# Patient Record
Sex: FEMALE | Race: WHITE | Employment: OTHER | URBAN - METROPOLITAN AREA
[De-identification: names, ages, dates, MRNs, and addresses within clinical notes are randomized per-mention and may not be internally consistent; named-entity substitution may affect disease eponyms.]

---

## 2017-01-10 ENCOUNTER — HOSPITAL ENCOUNTER (OUTPATIENT)
Dept: OTHER | Age: 67
Discharge: HOME | End: 2017-01-10
Attending: INTERNAL MEDICINE | Admitting: INTERNAL MEDICINE

## 2017-01-10 LAB
ANION GAP SERPL CALCULATED.3IONS-SCNC: 13 MMOL/L (ref 3–16)
BUN BLDV-MCNC: 28 MG/DL (ref 7–20)
CALCIUM SERPL-MCNC: 9.6 MG/DL (ref 8.3–10.6)
CHLORIDE BLD-SCNC: 101 MMOL/L (ref 99–110)
CO2: 26 MMOL/L (ref 21–32)
CREAT SERPL-MCNC: 1.1 MG/DL (ref 0.6–1.2)
GFR AFRICAN AMERICAN: >60
GFR NON-AFRICAN AMERICAN: 50
GLUCOSE BLD-MCNC: 69 MG/DL (ref 70–99)
POTASSIUM SERPL-SCNC: 5 MMOL/L (ref 3.5–5.1)
SODIUM BLD-SCNC: 140 MMOL/L (ref 136–145)

## 2017-01-11 LAB
ESTIMATED AVERAGE GLUCOSE: 85.3 MG/DL
HBA1C MFR BLD: 4.6 %

## 2017-01-23 ENCOUNTER — HOSPITAL ENCOUNTER (OUTPATIENT)
Dept: OTHER | Age: 67
Discharge: HOME | End: 2017-01-23
Attending: PHYSICIAN ASSISTANT | Admitting: PHYSICIAN ASSISTANT

## 2017-01-23 LAB — VITAMIN D 25-HYDROXY: 32.8 NG/ML

## 2018-04-11 ENCOUNTER — HOSPITAL ENCOUNTER (OUTPATIENT)
Dept: OTHER | Age: 68
Discharge: HOME | End: 2018-04-30
Attending: PHYSICIAN ASSISTANT | Admitting: PHYSICIAN ASSISTANT

## 2018-04-11 LAB
ALBUMIN SERPL-MCNC: 4.9 G/DL (ref 3.4–5)
ALP BLD-CCNC: 62 U/L (ref 40–129)
ALT SERPL-CCNC: 41 U/L (ref 10–40)
ANION GAP SERPL CALCULATED.3IONS-SCNC: 11 MMOL/L (ref 3–16)
AST SERPL-CCNC: 39 U/L (ref 15–37)
BASOPHILS ABSOLUTE: 0.1 K/UL (ref 0–0.2)
BASOPHILS RELATIVE PERCENT: 1.3 %
BILIRUB SERPL-MCNC: 1.3 MG/DL (ref 0–1)
BILIRUBIN DIRECT: 0.3 MG/DL (ref 0–0.3)
BILIRUBIN, INDIRECT: 1 MG/DL (ref 0–1)
BUN BLDV-MCNC: 24 MG/DL (ref 7–20)
CALCIUM SERPL-MCNC: 9.6 MG/DL (ref 8.3–10.6)
CHLORIDE BLD-SCNC: 100 MMOL/L (ref 99–110)
CO2: 28 MMOL/L (ref 21–32)
CREAT SERPL-MCNC: 1 MG/DL (ref 0.6–1.2)
EOSINOPHILS ABSOLUTE: 0.1 K/UL (ref 0–0.6)
EOSINOPHILS RELATIVE PERCENT: 2.3 %
GFR AFRICAN AMERICAN: >60
GFR NON-AFRICAN AMERICAN: 56
GLUCOSE BLD-MCNC: 102 MG/DL (ref 70–99)
HCT VFR BLD CALC: 39 % (ref 36–48)
HEMOGLOBIN: 13.7 G/DL (ref 12–16)
LYMPHOCYTES ABSOLUTE: 1.7 K/UL (ref 1–5.1)
LYMPHOCYTES RELATIVE PERCENT: 31.6 %
MCH RBC QN AUTO: 34.7 PG (ref 26–34)
MCHC RBC AUTO-ENTMCNC: 35.1 G/DL (ref 31–36)
MCV RBC AUTO: 98.9 FL (ref 80–100)
MONOCYTES ABSOLUTE: 0.4 K/UL (ref 0–1.3)
MONOCYTES RELATIVE PERCENT: 6.6 %
NEUTROPHILS ABSOLUTE: 3.2 K/UL (ref 1.7–7.7)
NEUTROPHILS RELATIVE PERCENT: 58.2 %
PDW BLD-RTO: 14 % (ref 12.4–15.4)
PHOSPHORUS: 3.7 MG/DL (ref 2.5–4.9)
PLATELET # BLD: 143 K/UL (ref 135–450)
PMV BLD AUTO: 7.5 FL (ref 5–10.5)
POTASSIUM SERPL-SCNC: 5.7 MMOL/L (ref 3.5–5.1)
RBC # BLD: 3.94 M/UL (ref 4–5.2)
SODIUM BLD-SCNC: 139 MMOL/L (ref 136–145)
TOTAL PROTEIN: 7 G/DL (ref 6.4–8.2)
WBC # BLD: 5.5 K/UL (ref 4–11)

## 2018-04-12 LAB
HBV SURFACE AB TITR SER: 79.29 MIU/ML
HEPATITIS B CORE TOTAL ANTIBODY: POSITIVE
HEPATITIS B SURFACE ANTIGEN INTERPRETATION: NORMAL
TACROLIMUS BLOOD: 8.8 NG/ML (ref 5–20)

## 2018-05-01 ENCOUNTER — HOSPITAL ENCOUNTER (OUTPATIENT)
Dept: OTHER | Age: 68
Discharge: HOME | End: 2018-05-31
Attending: PHYSICIAN ASSISTANT | Admitting: PHYSICIAN ASSISTANT

## 2018-10-01 ENCOUNTER — HOSPITAL ENCOUNTER (OUTPATIENT)
Age: 68
Discharge: HOME | End: 2018-10-01
Payer: MEDICARE

## 2018-10-01 LAB
ALBUMIN SERPL-MCNC: 4.8 G/DL (ref 3.4–5)
ALP BLD-CCNC: 58 U/L (ref 40–129)
ALT SERPL-CCNC: 20 U/L (ref 10–40)
ANION GAP SERPL CALCULATED.3IONS-SCNC: 15 MMOL/L (ref 3–16)
AST SERPL-CCNC: 27 U/L (ref 15–37)
BASOPHILS ABSOLUTE: 0 K/UL (ref 0–0.2)
BASOPHILS RELATIVE PERCENT: 1 %
BILIRUB SERPL-MCNC: 1.4 MG/DL (ref 0–1)
BILIRUBIN DIRECT: 0.3 MG/DL (ref 0–0.3)
BILIRUBIN, INDIRECT: 1.1 MG/DL (ref 0–1)
BUN BLDV-MCNC: 22 MG/DL (ref 7–20)
CALCIUM SERPL-MCNC: 9.8 MG/DL (ref 8.3–10.6)
CHLORIDE BLD-SCNC: 100 MMOL/L (ref 99–110)
CO2: 25 MMOL/L (ref 21–32)
CREAT SERPL-MCNC: 1 MG/DL (ref 0.6–1.2)
EOSINOPHILS ABSOLUTE: 0.1 K/UL (ref 0–0.6)
EOSINOPHILS RELATIVE PERCENT: 1.7 %
GFR AFRICAN AMERICAN: >60
GFR NON-AFRICAN AMERICAN: 55
GLUCOSE BLD-MCNC: 94 MG/DL (ref 70–99)
HBV SURFACE AB TITR SER: 67.85 MIU/ML
HCT VFR BLD CALC: 39.1 % (ref 36–48)
HEMOGLOBIN: 13.4 G/DL (ref 12–16)
HEPATITIS B SURFACE ANTIGEN INTERPRETATION: NORMAL
LYMPHOCYTES ABSOLUTE: 1.2 K/UL (ref 1–5.1)
LYMPHOCYTES RELATIVE PERCENT: 25.2 %
MCH RBC QN AUTO: 33.9 PG (ref 26–34)
MCHC RBC AUTO-ENTMCNC: 34.1 G/DL (ref 31–36)
MCV RBC AUTO: 99.3 FL (ref 80–100)
MONOCYTES ABSOLUTE: 0.2 K/UL (ref 0–1.3)
MONOCYTES RELATIVE PERCENT: 5.4 %
NEUTROPHILS ABSOLUTE: 3.1 K/UL (ref 1.7–7.7)
NEUTROPHILS RELATIVE PERCENT: 66.7 %
PDW BLD-RTO: 14 % (ref 12.4–15.4)
PHOSPHORUS: 3 MG/DL (ref 2.5–4.9)
PLATELET # BLD: 128 K/UL (ref 135–450)
PMV BLD AUTO: 7.8 FL (ref 5–10.5)
POTASSIUM SERPL-SCNC: 5.2 MMOL/L (ref 3.5–5.1)
PRO-BNP: 435 PG/ML (ref 0–124)
RBC # BLD: 3.94 M/UL (ref 4–5.2)
SODIUM BLD-SCNC: 140 MMOL/L (ref 136–145)
TOTAL PROTEIN: 7.1 G/DL (ref 6.4–8.2)
WBC # BLD: 4.6 K/UL (ref 4–11)

## 2018-10-01 PROCEDURE — 84100 ASSAY OF PHOSPHORUS: CPT

## 2018-10-01 PROCEDURE — 80076 HEPATIC FUNCTION PANEL: CPT

## 2018-10-01 PROCEDURE — 83880 ASSAY OF NATRIURETIC PEPTIDE: CPT

## 2018-10-01 PROCEDURE — 86706 HEP B SURFACE ANTIBODY: CPT

## 2018-10-01 PROCEDURE — 80048 BASIC METABOLIC PNL TOTAL CA: CPT

## 2018-10-01 PROCEDURE — 87340 HEPATITIS B SURFACE AG IA: CPT

## 2018-10-01 PROCEDURE — 36415 COLL VENOUS BLD VENIPUNCTURE: CPT

## 2018-10-01 PROCEDURE — 85025 COMPLETE CBC W/AUTO DIFF WBC: CPT

## 2018-10-01 PROCEDURE — 86704 HEP B CORE ANTIBODY TOTAL: CPT

## 2018-10-01 PROCEDURE — 80197 ASSAY OF TACROLIMUS: CPT

## 2018-10-02 LAB — TACROLIMUS BLOOD: 7.2 NG/ML (ref 5–20)

## 2018-10-03 LAB — HEPATITIS B CORE TOTAL ANTIBODY: POSITIVE

## 2018-12-04 ENCOUNTER — HOSPITAL ENCOUNTER (OUTPATIENT)
Age: 68
Discharge: HOME | End: 2018-12-04
Payer: MEDICARE

## 2018-12-04 ENCOUNTER — HOSPITAL ENCOUNTER (OUTPATIENT)
Dept: GENERAL RADIOLOGY | Age: 68
Discharge: HOME | End: 2018-12-04
Payer: MEDICARE

## 2018-12-04 DIAGNOSIS — J20.9 ACUTE BRONCHITIS, UNSPECIFIED ORGANISM: ICD-10-CM

## 2018-12-04 PROCEDURE — 71046 X-RAY EXAM CHEST 2 VIEWS: CPT

## 2019-05-16 ENCOUNTER — HOSPITAL ENCOUNTER (INPATIENT)
Age: 69
LOS: 8 days | Discharge: HOME | DRG: 559 | End: 2019-05-24
Attending: PHYSICAL MEDICINE & REHABILITATION | Admitting: PHYSICAL MEDICINE & REHABILITATION
Payer: MEDICARE

## 2019-05-16 DIAGNOSIS — T07.XXXA MULTIPLE TRAUMA: Primary | ICD-10-CM

## 2019-05-16 PROCEDURE — 51798 US URINE CAPACITY MEASURE: CPT

## 2019-05-16 PROCEDURE — 6370000000 HC RX 637 (ALT 250 FOR IP): Performed by: PHYSICAL MEDICINE & REHABILITATION

## 2019-05-16 PROCEDURE — 6360000002 HC RX W HCPCS: Performed by: PHYSICAL MEDICINE & REHABILITATION

## 2019-05-16 PROCEDURE — 1280000000 HC REHAB R&B

## 2019-05-16 RX ORDER — PRAVASTATIN SODIUM 10 MG
10 TABLET ORAL NIGHTLY
Status: DISCONTINUED | OUTPATIENT
Start: 2019-05-16 | End: 2019-05-24 | Stop reason: HOSPADM

## 2019-05-16 RX ORDER — GABAPENTIN 300 MG/1
300 CAPSULE ORAL 3 TIMES DAILY
Status: DISCONTINUED | OUTPATIENT
Start: 2019-05-16 | End: 2019-05-24 | Stop reason: HOSPADM

## 2019-05-16 RX ORDER — TRAZODONE HYDROCHLORIDE 50 MG/1
50 TABLET ORAL NIGHTLY PRN
Status: DISCONTINUED | OUTPATIENT
Start: 2019-05-16 | End: 2019-05-24 | Stop reason: HOSPADM

## 2019-05-16 RX ORDER — CITALOPRAM 20 MG/1
40 TABLET ORAL DAILY
Status: DISCONTINUED | OUTPATIENT
Start: 2019-05-17 | End: 2019-05-24 | Stop reason: HOSPADM

## 2019-05-16 RX ORDER — ONDANSETRON 4 MG/1
4 TABLET, ORALLY DISINTEGRATING ORAL EVERY 8 HOURS PRN
Status: DISCONTINUED | OUTPATIENT
Start: 2019-05-16 | End: 2019-05-24 | Stop reason: HOSPADM

## 2019-05-16 RX ORDER — POLYETHYLENE GLYCOL 3350 17 G/17G
17 POWDER, FOR SOLUTION ORAL 2 TIMES DAILY
Status: DISCONTINUED | OUTPATIENT
Start: 2019-05-16 | End: 2019-05-18

## 2019-05-16 RX ORDER — ACETAMINOPHEN 325 MG/1
650 TABLET ORAL EVERY 4 HOURS PRN
Status: DISCONTINUED | OUTPATIENT
Start: 2019-05-16 | End: 2019-05-24 | Stop reason: HOSPADM

## 2019-05-16 RX ORDER — ASPIRIN 81 MG/1
81 TABLET, CHEWABLE ORAL DAILY
Status: DISCONTINUED | OUTPATIENT
Start: 2019-05-17 | End: 2019-05-24 | Stop reason: HOSPADM

## 2019-05-16 RX ORDER — MYCOPHENOLATE MOFETIL 500 MG/1
500 TABLET ORAL 2 TIMES DAILY
Status: DISCONTINUED | OUTPATIENT
Start: 2019-05-16 | End: 2019-05-24 | Stop reason: HOSPADM

## 2019-05-16 RX ORDER — OMEGA-3S/DHA/EPA/FISH OIL/D3 300MG-1000
1000 CAPSULE ORAL DAILY
Status: DISCONTINUED | OUTPATIENT
Start: 2019-05-17 | End: 2019-05-24 | Stop reason: HOSPADM

## 2019-05-16 RX ORDER — TACROLIMUS 1 MG/1
2 CAPSULE ORAL 2 TIMES DAILY
Status: DISCONTINUED | OUTPATIENT
Start: 2019-05-16 | End: 2019-05-24 | Stop reason: HOSPADM

## 2019-05-16 RX ORDER — METHOCARBAMOL 500 MG/1
1000 TABLET, FILM COATED ORAL 3 TIMES DAILY
Status: DISCONTINUED | OUTPATIENT
Start: 2019-05-16 | End: 2019-05-24 | Stop reason: HOSPADM

## 2019-05-16 RX ORDER — HYDRALAZINE HYDROCHLORIDE 50 MG/1
50 TABLET, FILM COATED ORAL EVERY 8 HOURS PRN
Status: DISCONTINUED | OUTPATIENT
Start: 2019-05-16 | End: 2019-05-24 | Stop reason: HOSPADM

## 2019-05-16 RX ORDER — PROPRANOLOL HYDROCHLORIDE 20 MG/1
10 TABLET ORAL 2 TIMES DAILY
Status: DISCONTINUED | OUTPATIENT
Start: 2019-05-16 | End: 2019-05-24 | Stop reason: HOSPADM

## 2019-05-16 RX ORDER — IPRATROPIUM BROMIDE AND ALBUTEROL SULFATE 2.5; .5 MG/3ML; MG/3ML
1 SOLUTION RESPIRATORY (INHALATION) EVERY 6 HOURS PRN
Status: DISCONTINUED | OUTPATIENT
Start: 2019-05-16 | End: 2019-05-24 | Stop reason: HOSPADM

## 2019-05-16 RX ORDER — LIDOCAINE 4 G/G
1 PATCH TOPICAL DAILY
Status: DISCONTINUED | OUTPATIENT
Start: 2019-05-16 | End: 2019-05-24 | Stop reason: HOSPADM

## 2019-05-16 RX ORDER — DIPHENHYDRAMINE HCL 25 MG
25 TABLET ORAL EVERY 6 HOURS PRN
Status: DISCONTINUED | OUTPATIENT
Start: 2019-05-16 | End: 2019-05-24 | Stop reason: HOSPADM

## 2019-05-16 RX ORDER — OXYCODONE HYDROCHLORIDE 5 MG/1
5 TABLET ORAL EVERY 6 HOURS PRN
Status: DISCONTINUED | OUTPATIENT
Start: 2019-05-16 | End: 2019-05-24 | Stop reason: HOSPADM

## 2019-05-16 RX ADMIN — OXYCODONE HYDROCHLORIDE 5 MG: 5 TABLET ORAL at 23:39

## 2019-05-16 RX ADMIN — PROPRANOLOL HYDROCHLORIDE 10 MG: 20 TABLET ORAL at 23:40

## 2019-05-16 RX ADMIN — MYCOPHENOLATE MOFETIL 500 MG: 500 TABLET, FILM COATED ORAL at 23:42

## 2019-05-16 RX ADMIN — PRAVASTATIN SODIUM 10 MG: 10 TABLET ORAL at 23:40

## 2019-05-16 RX ADMIN — TACROLIMUS 2 MG: 1 CAPSULE ORAL at 23:40

## 2019-05-16 RX ADMIN — METHOCARBAMOL TABLETS 1000 MG: 500 TABLET, COATED ORAL at 23:39

## 2019-05-16 RX ADMIN — GABAPENTIN 300 MG: 300 CAPSULE ORAL at 23:39

## 2019-05-16 ASSESSMENT — PAIN DESCRIPTION - LOCATION
LOCATION: RIB CAGE
LOCATION: RIB CAGE

## 2019-05-16 ASSESSMENT — PAIN SCALES - GENERAL
PAINLEVEL_OUTOF10: 7
PAINLEVEL_OUTOF10: 6
PAINLEVEL_OUTOF10: 7
PAINLEVEL_OUTOF10: 2

## 2019-05-16 ASSESSMENT — PAIN DESCRIPTION - ONSET
ONSET: ON-GOING
ONSET: ON-GOING

## 2019-05-16 ASSESSMENT — PAIN DESCRIPTION - FREQUENCY
FREQUENCY: INTERMITTENT
FREQUENCY: INTERMITTENT

## 2019-05-16 ASSESSMENT — PAIN - FUNCTIONAL ASSESSMENT
PAIN_FUNCTIONAL_ASSESSMENT: PREVENTS OR INTERFERES SOME ACTIVE ACTIVITIES AND ADLS
PAIN_FUNCTIONAL_ASSESSMENT: PREVENTS OR INTERFERES SOME ACTIVE ACTIVITIES AND ADLS

## 2019-05-16 ASSESSMENT — PAIN DESCRIPTION - PROGRESSION
CLINICAL_PROGRESSION: NOT CHANGED
CLINICAL_PROGRESSION: GRADUALLY WORSENING
CLINICAL_PROGRESSION: GRADUALLY WORSENING

## 2019-05-16 ASSESSMENT — PAIN DESCRIPTION - DESCRIPTORS
DESCRIPTORS: STABBING
DESCRIPTORS: STABBING

## 2019-05-16 ASSESSMENT — PAIN DESCRIPTION - ORIENTATION
ORIENTATION: LEFT
ORIENTATION: LEFT

## 2019-05-16 ASSESSMENT — PAIN DESCRIPTION - PAIN TYPE
TYPE: ACUTE PAIN
TYPE: ACUTE PAIN

## 2019-05-17 LAB
A/G RATIO: 1.5 (ref 1.1–2.2)
ALBUMIN SERPL-MCNC: 3.6 G/DL (ref 3.4–5)
ALP BLD-CCNC: 125 U/L (ref 40–129)
ALT SERPL-CCNC: 8 U/L (ref 10–40)
ANION GAP SERPL CALCULATED.3IONS-SCNC: 10 MMOL/L (ref 3–16)
AST SERPL-CCNC: 20 U/L (ref 15–37)
BILIRUB SERPL-MCNC: 0.6 MG/DL (ref 0–1)
BUN BLDV-MCNC: 18 MG/DL (ref 7–20)
CALCIUM SERPL-MCNC: 9.1 MG/DL (ref 8.3–10.6)
CHLORIDE BLD-SCNC: 101 MMOL/L (ref 99–110)
CO2: 29 MMOL/L (ref 21–32)
CREAT SERPL-MCNC: 0.9 MG/DL (ref 0.6–1.2)
GFR AFRICAN AMERICAN: >60
GFR NON-AFRICAN AMERICAN: >60
GLOBULIN: 2.4 G/DL
GLUCOSE BLD-MCNC: 99 MG/DL (ref 70–99)
HCT VFR BLD CALC: 26.3 % (ref 36–48)
HEMOGLOBIN: 8.9 G/DL (ref 12–16)
MCH RBC QN AUTO: 33.6 PG (ref 26–34)
MCHC RBC AUTO-ENTMCNC: 34 G/DL (ref 31–36)
MCV RBC AUTO: 98.8 FL (ref 80–100)
PDW BLD-RTO: 16.1 % (ref 12.4–15.4)
PLATELET # BLD: 227 K/UL (ref 135–450)
PMV BLD AUTO: 7.1 FL (ref 5–10.5)
POTASSIUM SERPL-SCNC: 4.8 MMOL/L (ref 3.5–5.1)
RBC # BLD: 2.66 M/UL (ref 4–5.2)
SODIUM BLD-SCNC: 140 MMOL/L (ref 136–145)
TOTAL PROTEIN: 6 G/DL (ref 6.4–8.2)
WBC # BLD: 2.3 K/UL (ref 4–11)

## 2019-05-17 PROCEDURE — 97161 PT EVAL LOW COMPLEX 20 MIN: CPT | Performed by: PHYSICAL THERAPIST

## 2019-05-17 PROCEDURE — 36415 COLL VENOUS BLD VENIPUNCTURE: CPT

## 2019-05-17 PROCEDURE — 80053 COMPREHEN METABOLIC PANEL: CPT

## 2019-05-17 PROCEDURE — 85027 COMPLETE CBC AUTOMATED: CPT

## 2019-05-17 PROCEDURE — 1280000000 HC REHAB R&B

## 2019-05-17 PROCEDURE — 97535 SELF CARE MNGMENT TRAINING: CPT

## 2019-05-17 PROCEDURE — 97116 GAIT TRAINING THERAPY: CPT | Performed by: PHYSICAL THERAPIST

## 2019-05-17 PROCEDURE — 97530 THERAPEUTIC ACTIVITIES: CPT | Performed by: PHYSICAL THERAPIST

## 2019-05-17 PROCEDURE — 97542 WHEELCHAIR MNGMENT TRAINING: CPT | Performed by: PHYSICAL THERAPIST

## 2019-05-17 PROCEDURE — 6360000002 HC RX W HCPCS: Performed by: PHYSICAL MEDICINE & REHABILITATION

## 2019-05-17 PROCEDURE — 97165 OT EVAL LOW COMPLEX 30 MIN: CPT

## 2019-05-17 PROCEDURE — 97530 THERAPEUTIC ACTIVITIES: CPT

## 2019-05-17 PROCEDURE — 6370000000 HC RX 637 (ALT 250 FOR IP): Performed by: PHYSICAL MEDICINE & REHABILITATION

## 2019-05-17 RX ORDER — CALCIUM CARBONATE 200(500)MG
500 TABLET,CHEWABLE ORAL 3 TIMES DAILY PRN
Status: DISCONTINUED | OUTPATIENT
Start: 2019-05-17 | End: 2019-05-24 | Stop reason: HOSPADM

## 2019-05-17 RX ADMIN — CHOLECALCIFEROL (VITAMIN D3) 10 MCG (400 UNIT) TABLET 1000 UNITS: at 09:17

## 2019-05-17 RX ADMIN — METHOCARBAMOL TABLETS 1000 MG: 500 TABLET, COATED ORAL at 20:33

## 2019-05-17 RX ADMIN — ASPIRIN 81 MG 81 MG: 81 TABLET ORAL at 09:17

## 2019-05-17 RX ADMIN — MYCOPHENOLATE MOFETIL 500 MG: 500 TABLET, FILM COATED ORAL at 09:17

## 2019-05-17 RX ADMIN — METHOCARBAMOL TABLETS 1000 MG: 500 TABLET, COATED ORAL at 13:44

## 2019-05-17 RX ADMIN — TACROLIMUS 2 MG: 1 CAPSULE ORAL at 09:17

## 2019-05-17 RX ADMIN — PROPRANOLOL HYDROCHLORIDE 10 MG: 20 TABLET ORAL at 09:45

## 2019-05-17 RX ADMIN — METHOCARBAMOL TABLETS 1000 MG: 500 TABLET, COATED ORAL at 09:16

## 2019-05-17 RX ADMIN — ENOXAPARIN SODIUM 40 MG: 40 INJECTION SUBCUTANEOUS at 09:18

## 2019-05-17 RX ADMIN — PROPRANOLOL HYDROCHLORIDE 10 MG: 20 TABLET ORAL at 20:34

## 2019-05-17 RX ADMIN — MYCOPHENOLATE MOFETIL 500 MG: 500 TABLET, FILM COATED ORAL at 20:34

## 2019-05-17 RX ADMIN — GABAPENTIN 300 MG: 300 CAPSULE ORAL at 09:16

## 2019-05-17 RX ADMIN — GABAPENTIN 300 MG: 300 CAPSULE ORAL at 13:44

## 2019-05-17 RX ADMIN — TACROLIMUS 2 MG: 1 CAPSULE ORAL at 20:34

## 2019-05-17 RX ADMIN — GABAPENTIN 300 MG: 300 CAPSULE ORAL at 20:34

## 2019-05-17 RX ADMIN — OXYCODONE HYDROCHLORIDE 5 MG: 5 TABLET ORAL at 20:34

## 2019-05-17 RX ADMIN — OXYCODONE HYDROCHLORIDE 5 MG: 5 TABLET ORAL at 05:58

## 2019-05-17 RX ADMIN — OXYCODONE HYDROCHLORIDE 5 MG: 5 TABLET ORAL at 13:44

## 2019-05-17 RX ADMIN — CITALOPRAM HYDROBROMIDE 40 MG: 20 TABLET ORAL at 09:16

## 2019-05-17 RX ADMIN — PRAVASTATIN SODIUM 10 MG: 10 TABLET ORAL at 20:33

## 2019-05-17 RX ADMIN — CALCIUM CARBONATE 500 MG: 500 TABLET, CHEWABLE ORAL at 20:33

## 2019-05-17 ASSESSMENT — PAIN DESCRIPTION - PROGRESSION
CLINICAL_PROGRESSION: NOT CHANGED

## 2019-05-17 ASSESSMENT — PAIN DESCRIPTION - ONSET
ONSET: ON-GOING

## 2019-05-17 ASSESSMENT — PAIN DESCRIPTION - PAIN TYPE
TYPE: ACUTE PAIN

## 2019-05-17 ASSESSMENT — PAIN DESCRIPTION - FREQUENCY
FREQUENCY: INTERMITTENT

## 2019-05-17 ASSESSMENT — PAIN DESCRIPTION - DESCRIPTORS
DESCRIPTORS: STABBING
DESCRIPTORS: STABBING
DESCRIPTORS: SHARP
DESCRIPTORS: STABBING
DESCRIPTORS: STABBING

## 2019-05-17 ASSESSMENT — PAIN SCALES - GENERAL
PAINLEVEL_OUTOF10: 5
PAINLEVEL_OUTOF10: 0
PAINLEVEL_OUTOF10: 6
PAINLEVEL_OUTOF10: 3
PAINLEVEL_OUTOF10: 7
PAINLEVEL_OUTOF10: 6
PAINLEVEL_OUTOF10: 7
PAINLEVEL_OUTOF10: 6

## 2019-05-17 ASSESSMENT — PAIN DESCRIPTION - ORIENTATION
ORIENTATION: LEFT

## 2019-05-17 ASSESSMENT — PAIN DESCRIPTION - LOCATION
LOCATION: RIB CAGE
LOCATION: RIB CAGE
LOCATION: RIB CAGE;BACK
LOCATION: BACK;RIB CAGE
LOCATION: RIB CAGE

## 2019-05-17 NOTE — H&P
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 Imaging revealed left anterior 3-9 and left posterior 11th rib fractures. She required placement of chest tube which was performed  and subsequently removed on . She was also noted to have an anterior T3 wedge fracture and was suggested to utilize a TLSO when out of bed. She was evaluated by therapy suggested to continue in an inpatient setting prior to returning home. She reports her pain currently is controlled. 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 has a past surgical history that includes Cholecystectomy ();  section (); Upper gastrointestinal endoscopy (11); Colonoscopy; Colonoscopy; Upper gastrointestinal endoscopy (); Upper gastrointestinal endoscopy (2011); Upper gastrointestinal endoscopy (11); Upper gastrointestinal endoscopy (11); Endoscopy, colon, diagnostic; Upper gastrointestinal endoscopy (10-6-14); Colonoscopy (11/10/14); Upper gastrointestinal endoscopy (11/18/15); and fracture surgery. reports that she quit smoking about 4 years ago.  Her smoking use included cigarettes. She has a 25.00 pack-year smoking history. She quit smokeless tobacco use about 4 years ago. She reports that she does not drink alcohol or use drugs. family history includes Allergy (Severe) in her brother; Cancer in her brother and sister; Coronary Art Dis in her brother; Diabetes in her mother; Heart Disease in her brother and father; High Blood Pressure in her brother, father, and sister; Prostate Cancer in her brother; Stroke in her mother. Allergies: Latex; Erythromycin; Penicillins; Macrolides and ketolides; Tape [adhesive tape];  Tetracycline; and Tetracyclines & related    Current Facility-Administered Medications   Medication Dose Route Frequency Provider Last Rate Last Dose    acetaminophen (TYLENOL) tablet 650 mg  650 mg Oral Q4H PRN Tip Montalvo MD        enoxaparin (LOVENOX) injection 40 mg  40 mg Subcutaneous Daily Tip Montalvo MD   40 mg at 05/17/19 0918    magnesium hydroxide (MILK OF MAGNESIA) 400 MG/5ML suspension 30 mL  30 mL Oral Daily PRN Tip Montalvo MD        aspirin chewable tablet 81 mg  81 mg Oral Daily Tip Montalvo MD   81 mg at 05/17/19 0917    citalopram (CELEXA) tablet 40 mg  40 mg Oral Daily Tip Montalvo MD   40 mg at 05/17/19 0916    diphenhydrAMINE (BENADRYL) tablet 25 mg  25 mg Oral Q6H PRN Tip Montalvo MD        gabapentin (NEURONTIN) capsule 300 mg  300 mg Oral TID Tip Montalvo MD   300 mg at 05/17/19 1344    hydrALAZINE (APRESOLINE) tablet 50 mg  50 mg Oral Q8H PRN Tip Montalvo MD        ipratropium-albuterol (DUONEB) nebulizer solution 1 ampule  1 ampule Inhalation Q6H PRN Tip Montalvo MD        lidocaine 4 % external patch 1 patch  1 patch Transdermal Daily Tip Montalvo MD   1 patch at 05/17/19 0804    methocarbamol (ROBAXIN) tablet 1,000 mg  1,000 mg Oral TID Tip Montalvo MD   1,000 mg at 05/17/19 1344    mycophenolate (CELLCEPT) tablet 500 mg  500 mg Oral BID Tip Montalvo MD   500 mg at 05/17/19 0917    ondansetron (ZOFRAN-ODT) disintegrating tablet 4 mg  4 mg Oral Q8H PRN Pete Castorena MD        oxyCODONE (ROXICODONE) immediate release tablet 5 mg  5 mg Oral Q6H PRN Pete Castorena MD   5 mg at 05/17/19 1344    polyethylene glycol (GLYCOLAX) packet 17 g  17 g Oral BID Pete Castorena MD        pravastatin (PRAVACHOL) tablet 10 mg  10 mg Oral Nightly Pete Castorena MD   10 mg at 05/16/19 2340    propranolol (INDERAL) tablet 10 mg  10 mg Oral BID Pete Castorena MD   10 mg at 05/17/19 0945    tacrolimus (PROGRAF) capsule 2 mg  2 mg Oral BID Pete Castorena MD   2 mg at 05/17/19 2973    traZODone (DESYREL) tablet 50 mg  50 mg Oral Nightly PRN Pete Castorena MD        vitamin D3 (CHOLECALCIFEROL) tablet 1,000 Units  1,000 Units Oral Daily Pete Castorena MD   1,000 Units at 05/17/19 8969       REVIEW OF SYSTEMS:   CONSTITUTIONAL: negative for fevers, chills, diaphoresis, appetite change, fatigue, night sweats and unexpected weight change. EYES: negative for blurred vision, eye discharge, visual disturbance and icterus. HEENT: negative for hearing loss, tinnitus, ear drainage, sinus pressure, nasal congestion, and epistaxis. RESPIRATORY: Negative for hemoptysis, cough, sputum production. CARDIOVASCULAR: negative for chest pain, palpitations, exertional chest pressure/discomfort, edema, syncope. GASTROINTESTINAL: negative for nausea, vomiting, diarrhea, constipation, blood in stool and abdominal pain. GENITOURINARY: negative for frequency, dysuria, urinary incontinence, decreased urine volume, and hematuria. HEMATOLOGIC/LYMPHATIC: negative for easy bruising, bleeding and lymphadenopathy. ALLERGIC/IMMUNOLOGIC: negative for recurrent infections, angioedema, anaphylaxis and drug reactions. ENDOCRINE: negative for weight changes and diabetic symptoms including polyuria, polydipsia and polyphagia.    MUSCULOSKELETAL: negative for joint swelling, decreased range of motion and muscle weakness. NEUROLOGICAL: negative for headaches, slurred speech, unilateral weakness. PSYCHIATRIC/BEHAVIORAL: negative for hallucinations, behavioral problems, confusion and agitation. All pertinent positives are noted in the HPI. Physical Examination:  Vitals:   Patient Vitals for the past 24 hrs:   BP Temp Temp src Pulse Resp SpO2 Height Weight   05/17/19 0900 (!) 111/53 98.3 °F (36.8 °C) Oral 84 20 92 % -- --   05/17/19 0551 126/66 98.4 °F (36.9 °C) Oral 76 20 93 % -- --   05/16/19 2044 -- -- -- -- -- 90 % -- --   05/16/19 2015 106/83 97.8 °F (36.6 °C) Axillary 82 20 (!) 86 % 5' 6\" (1.676 m) 129 lb 10.1 oz (58.8 kg)     Psych: Stable mood, normal judgement, normal affect   Const: No distress  Eyes: Conjunctiva noninjected, no icterus noted; pupils equal, round. HENT: Atraumatic, normocephalic; Oral mucosa moist  Neck: Trachea midline, neck supple. No thyromegaly noted. CV: Regular rate and rhythm, no murmur rub or gallop noted  Resp: Lungs clear to auscultation bilaterally, no rales wheezes or ronchi, no retractions. Respirations unlabored. GI: Soft, nontender, nondistended. Normal bowel sounds. No palpable masses. Neuro: Alert, oriented, appropriate. No cranial nerve deficits appreciated. Sensation intact to light touch. Motor examination reveals normal strength in all four limbs diffusely. Reflexes normal and symmetric. Skin: Normal temperature and turgor  MSK: No joint abnormalities noted. Ext: No significant edema appreciated. No varicosities.     Lab Results   Component Value Date    WBC 2.3 (L) 05/17/2019    HGB 8.9 (L) 05/17/2019    HCT 26.3 (L) 05/17/2019    MCV 98.8 05/17/2019     05/17/2019     Lab Results   Component Value Date    INR 1.22 (H) 04/20/2016    INR 1.11 10/28/2015    INR 1.23 (H) 05/13/2015    PROTIME 14.0 (H) 04/20/2016    PROTIME 12.0 10/28/2015    PROTIME 13.4 (H) 05/13/2015     Lab Results   Component Value Date    CREATININE 0.9 05/17/2019    BUN 18 05/17/2019     05/17/2019    K 4.8 05/17/2019     05/17/2019    CO2 29 05/17/2019     Lab Results   Component Value Date    ALT 8 (L) 05/17/2019    AST 20 05/17/2019    ALKPHOS 125 05/17/2019    BILITOT 0.6 05/17/2019       Most recent imaging studies revealed   05/06/2019 11:10 AM EDT    Exam: CT CHEST WO CONTRAST dated 5/6/2019 9:57 AM EDT    CLINICAL HISTORY: Chest trauma, fall. History of liver transplant. COMPARISON: Prior chest CT dated 4/30/2019 and 8/6/2016. Prior CT thoracic spine dated 4/30/2019. TECHNIQUE: Multidetector CT imaging was obtained through the chest in the supine position without intravenous contrast. The images were reconstructed with 5 and 1 mm collimation. Additional axial MIP images were reconstructed. FOV: 36 cm    FINDINGS:     HEART: The heart is normal in size. No significant pericardial effusion. Calcified plaque within the coronary arteries. THORACIC AORTA: Normal caliber and contour. There is a left-sided aortic arch with the left vertebral artery arising directly off the aorta, a normal variant. There are scattered calcified atherosclerotic plaques noted throughout the aorta and its branches. PULMONARY ARTERIES: Normal in caliber. MEDIASTINUM AND MANSOOR: There is a borderline enlarged right precarinal lymph node measuring up to 1.0 cm in short axis with a preserved fatty hilum, likely reactive. Otherwise no suspicious mediastinal or hilar lymphadenopathy. PLEURA: New small bilateral pleural effusions, left greater the right. No evidence of pneumothorax. AIRWAYS & LUNGS: There are secretions noted within the trachea. Additionally, there are scattered areas of probable mucous plugging. Otherwise, the central airways are patent. There is new left lower lobe consolidation adjacent to the pleural effusion and mild peripheral adjacent groundglass opacity.  There is also mild consolidation adjacent to the right pleural effusion with patchy peribronchovascular consolidative opacities in the right lower lobe. Mild new peripheral lingular consolidation. There are a few new ground glass opacities in the anterior right and left upper lobes. There are no new suspicious pulmonary nodules. UPPER ABDOMEN: Postsurgical changes of prior liver transplant are noted. There is trace perisplenic ascites. MUSCULOSKELETAL: There are fractures of the left anterior third, fourth, and fifth ribs. There are segmental fractures of the left sixth, seventh, eighth, and suspected ninth ribs although the anterior ninth rib is partially excluded from the field-of-view. The segmental fractures are somewhat increased in conspicuity from the prior study. Nondisplaced fractures of the left posterior 10th and 11th ribs are noted. Multiple healed right-sided rib fracture deformities are redemonstrated. Mild anterior wedging of T3 is unchanged from 4/30/2019 but is new from 8/6/2016. There is a mild compression deformity of T6 is unchanged in 2016. IMPRESSION:    New bilateral pleural effusions, multifocal areas of consolidation, and groundglass opacities most compatible with multifocal pneumonia. Redemonstration of multiple left-sided rib fractures as detailed above with segmental fractures of the left 6th-9th ribs. Recommend clinical correlation for flail chest.    Redemonstration of mild anterior wedging of T3, new from 2016 and consistent with an age-indeterminate fracture. The above laboratory data have been reviewed. The above imaging data have been reviewed. The above medical testing have been reviewed. Body mass index is 20.92 kg/m². Barriers to Discharge: pain, safety, ADLs, respiratory status    POST ADMISSION PHYSICIAN EVALUATION  The patient has agreed to being admitted to our comprehensive inpatient  rehabilitation facility consisting of at least 180 minutes of therapy a day,  5 out of 7 days a week.     The patient/family has a good understanding of our discharge process. The  patient has potential to make improvement and is in need of at least two of  the following multidisciplinary therapies including but not limited to  physical, occupational, respiratory, and speech, nutritional services, wound care, and prosthetics and orthotics. Given the patients complex condition  and risk of further medical complications, rehabilitation services cannot be  safely provided at a lower level of care such as a skilled nursing facility. I have compared the patients medical and functional status at the time of the  preadmission screening and the same on this date, and there are no significant changes except as documented below in the assessment and plan. By signing this document, I acknowledge that I have personally performed a  full physical examination on this patient within 24 hours of admission to  this inpatient rehabilitation facility and have determined the patient to be  able to tolerate the above course of treatment at an intensive level for a  reasonable period of time. I will be completing a detailed individualized  Plan of Care for this patient by day four of the patients stay based upon the  Preadmission Screen, this Post-Admission Evaluation, and the therapy  evaluations. Assessment and Plan:  Multiple trauma: pain control and pulmonary toilet for rib fractures. TLSO for T3 compression fx. PT/OT    OLT: cellcept and prograf levels appropriate at . CMP appropriate on admission. Neuropathy: gabapentin    HTN: inderal      Depression: celexa    CAD: ASA    Bowels: Per protocol  Bladder: Per protocol   Sleep: Trazodone provided prn. Pain: Robaxin scheduled, crow PRN  DVT PPx: Lovenox     Kike Ma MD 5/17/2019, 5:35 PM     * This document was created using dictation software. While all precautions were taken to ensure accuracy, errors may have occurred. Please disregard any typographical errors.

## 2019-05-17 NOTE — PROGRESS NOTES
Call out to Dr. Viera Sensing office about showering without brace.     His office called back, see N.O.

## 2019-05-17 NOTE — PLAN OF CARE
Problem: Pain:  Goal: Pain level will decrease  Description  Pain level will decrease  Outcome: Ongoing   Pt. Complaint of pain, pain medication administered, see MAR. Pt. Complaint of excruciating pain to left shoulder upon movement. Pain level will be decreased or be at a satisfactory level by discharge. Problem: Falls - Risk of:  Goal: Will remain free from falls  Description  Will remain free from falls  Outcome: Ongoing   Pt. Admitted to facility with impaired gait and weakness. Fall prevention measures in place to promote safety and reduce the risk of falls: Fall sign posted on door, bed wheels locked and in lowest position, call light and over bed table placed within reach., video surveillance in place. Hourly rounding and frequent visual checks in place.  Will continue to monitor

## 2019-05-17 NOTE — PROGRESS NOTES
Pt. Arrived on the unit via stretcher and transport team at approximately 2005 hr. Pt appears A & O X 4 afebrile, with MORALES. Oxygen via NC at 2L and de-sat to 86%. O2 increased to 3L  For therapeutic purpose then will be decreased back to 2 L . Pt. Oriented to room and facility policy. Verbalized understanding.

## 2019-05-17 NOTE — PROGRESS NOTES
Physical Therapy    Facility/Department: [place] [address] [facility]  Initial Assessment/ Daily Treatment note    NAME: Trell Hall  : 1950  MRN: [id number]    Date of Service: 2019    Discharge Recommendations:  Home independently, Home with Home health PT   PT Equipment Recommendations  Other: Ongoing assessment at this time    Assessment   Body structures, Functions, Activity limitations: Decreased functional mobility ; Decreased endurance;Decreased balance; Increased Pain  Assessment: Pt is a 70YO female s/p a fall down concrete steps resulting in multiple injuries. At this time , pt is limited by pain and SOB. PT was not on O2 PTA and now is req 4L and with activity 5L to maintain O2 sats > 90%. Pt has pain with deep breaths and has become very congested. PT noted min impulsivity with walking and transf . Pt is highly motivated to resume PLOF . Pt is well below baseline and would benefit from continued therapy to maximize potential and increase functional mobility towards Ind to allow for a safer return home. Treatment Diagnosis: Multiple trauma resulting in decreased functional mobility  Decision Making: Low Complexity  Patient Education: Role of PT, the alarm systems in the room, transf training, gt training, bed mobility skills and w/c training. PT also educated pt on positioning for postural drainage   Barriers to Learning: None noted  REQUIRES PT FOLLOW UP: Yes       Patient Diagnosis(es): There were no encounter diagnoses.      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, COPD (chronic obstructive pulmonary disease) ([facility] Diabetes mellitus ([facility] End stage liver disease ([facility] Esophageal varices with bleeding(456.0), GERD (gastroesophageal reflux disease), GERD (gastroesophageal reflux disease), History of blood transfusion, Hyperlipidemia, Hypertension, IBS (irritable bowel syndrome), Liver disease, NSTEMI (non-ST elevated myocardial infarction) ([facility] to porch -2 different ways to enter each has 1 railing )  Entrance Stairs - Rails: Both  Bathroom Shower/Tub: Shower chair with back, Tub/Shower unit  Bathroom Toilet: Standard  Bathroom Equipment: Grab bars in shower(Uses sink to get up off the commode)  Bathroom Accessibility: Accessible  Home Equipment: Grab bars  Receives Help From: Family  ADL Assistance: Independent  Homemaking Assistance: Independent  Homemaking Responsibilities: Yes(son does laundry)  Ambulation Assistance: Independent  Transfer Assistance: Independent  Active : Yes  Mode of Transportation: Car  Education: Masters in [place]  Occupation: Part time employment  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 [address] , meeting with friends, cook   Cognition        Objective          AROM RLE (degrees)  RLE AROM: WNL  AROM LLE (degrees)  LLE AROM : WNL  Strength RLE  Strength RLE: WFL  Comment: >or= 3/5 (MMT deferred 2/2 to recent surgery )   Strength LLE  Strength LLE: WFL  Comment: >or = 3/5 (MMT deferred 2/2 to recent surgery )         Bed mobility  Rolling to Left: Modified independent(Severe rib pain elicited when rolling to that side)  Rolling to Right: Contact guard assistance(Req  VC  for technique and to obtain the full range)  Supine to Sit: Contact guard assistance(VC for energy efficient technique and to minimize pain)  Sit to Supine: Supervision(VC for technique )  Scooting: Independent  Comment: PT attempted to position bed flat  but 2/2 to breathing issues pt was unable to aureliano a flat bed. The  bedrails were also lowered to simulate home situation.    Transfers  Sit to Stand: Contact guard assistance( from bed, BS chair, and armed chair to a RW)  Stand to sit: Stand by assistance  Bed to Chair: Contact guard assistance(Able to take a few steps )  Stand Pivot Transfers: Contact guard assistance  Ambulation  Ambulation?: Yes  WB Status: No WB restrictions noted in the chart  More Ambulation?: Yes  Ambulation 1  Surface: level tile  Device: Rolling Walker  Other Apparatus: O2(4-5L )  Assistance: Contact guard assistance  Quality of Gait: Narrow ACACIA, min unsteady, circumducts LLE with swing through  Distance: 105', 160' short distances in the gym,   Ambulation 2  Surface - 2: ramp  Device 2: [address] [facility] 2: Contact guard assistance  Quality of Gait 2: Refer to above   Distance: 100' x1  Comments: CGA 2/2 to decreased control with managing the RW when descending  Stairs/Curb  Stairs?: Yes  Stairs  # Steps : 3  Stairs Height: 6\"  Rails: Bilateral  Device: No Device  Assistance: Contact guard assistance  Wheelchair Activities  Wheelchair Size: 18''  Wheelchair Type: Standard  Wheelchair Cushion: Pressure Relieving  Wheelchair Parts Management: Yes  Left Brakes Level of Assistance: Supervision(VC to fully engage the brake)  Right Brakes Level of Assistance: Supervision(VC to fully engage the brake)  Propulsion: Yes  Propulsion 1  Propulsion: Manual  Level: Level Tile  Method: RLE;LLE;LUE;RUE  Level of Assistance: Supervision(Req A to transport O2)  Description/ Details: used all 4 extrem but PT encouraged pt to minimize LUE use to minimize pain  Distance: 150'     Balance  Sitting - Static: Good  Sitting - Dynamic: Good  Standing - Static: Fair  Standing - Dynamic: Fair  Comments: 2/2 to pain , pt tends to flex at trunk and appears minimally \"guarded\"        Second session: Pt sitting in BS chair when PT arrived. Pt agreeable to therapy.    Transfers  Sit to Stand: Contact guard assistance/SBA(( from  [facility] chair, commode and  armed chair to a RW)  Stand to sit: Stand by assistance( VC for hand placement)    Ambulation 1  Surface: level tile  Device: Rolling Walker  Other Apparatus: O2(4-5L )  Assistance: Contact guard assistance  Distance: 100' x2  W/c propulsion( instructed pt in the room educating pt on the possibility following all therapy evaluations and collaboration by the team for pt to be

## 2019-05-17 NOTE — PROGRESS NOTES
Pt up in chair at this time. Reports 3/10 pain to L side of rib cage satisfied with pillow repositioned. All meds taken without difficulty. Call light within reach and chair alarm engaged. For patient safety, Visual Surveillance is in place, reviewed with patient/family, verbalized understanding.

## 2019-05-17 NOTE — PROGRESS NOTES
RESPIRATORY THERAPY ASSESSMENT    Name:  [address] [place] [address] Record Number:  [id number]  Age: 79 y.o. Gender: female  : 1950  Today's Date:  2019  Room:  3105/3105-01    Assessment     Is the patient being admitted for a COPD or Asthma exacerbation? No   (If yes the patient will be seen every 4 hours for the first 24 hours and then reassessed)    Patient Admission Diagnosis      Allergies  Allergies   Allergen Reactions    Latex Rash    Erythromycin Hives     Chest pain, hives    All mycins cause this problem  Chest pain, hives    All mycins cause this problem    Penicillins Hives    Macrolides And Ketolides     Tape Marisa Crate Tape] Rash     Occurs with prolonged exposure    Tetracycline Hives, Nausea And Vomiting and Nausea Only     Severe stomach pain    Tetracyclines & Related Hives, Nausea And Vomiting and Other (See Comments)     Severe stomach pain       Minimum Predicted Vital Capacity:     748          Actual Vital Capacity:      1250              Pulmonary History:COPD  Home Oxygen Therapy:  room air  Home Respiratory Therapy:Albuterol/Ipratropium Bromide HHN   Current Respiratory Therapy:  hhn duoneb prn          Respiratory Severity Index(RSI)   Patients with orders for inhalation medications, oxygen, or any therapeutic treatment modality will be placed on Respiratory Protocol. They will be assessed with the first treatment and at least every 72 hours thereafter. The following severity scale will be used to determine frequency of treatment intervention.     Smoking History: Pulmonary Disease or Smoking History, Greater than 15 pack year = 2    Social History  Social History     Tobacco Use    Smoking status: Former Smoker     Packs/day: 1.00     Years: 25.00     Pack years: 25.00     Types: Cigarettes     Last attempt to quit: 10/19/2014     Years since quittin.5    Smokeless tobacco: Former User     Quit date: 2014    Tobacco comment: since she was 25 y/o Substance Use Topics    Alcohol use: No    Drug use: No       Recent Surgical History: None = 0  Past Surgical History  Past Surgical History:   Procedure Laterality Date     SECTION      CHOLECYSTECTOMY      COLONOSCOPY      polypectomy    COLONOSCOPY      COLONOSCOPY  11/10/14    ENDOSCOPY, COLON, DIAGNOSTIC      UPPER GASTROINTESTINAL ENDOSCOPY  11    UPPER GASTROINTESTINAL ENDOSCOPY      w banding    UPPER GASTROINTESTINAL ENDOSCOPY  2011    UPPER GASTROINTESTINAL ENDOSCOPY  11    with banding    UPPER GASTROINTESTINAL ENDOSCOPY  11    UPPER GASTROINTESTINAL ENDOSCOPY  10-6-14    with banding    UPPER GASTROINTESTINAL ENDOSCOPY  11/18/15    with EUS and banding       Level of Consciousness: Alert, Oriented, and Cooperative = 0    Level of Activity: Walking with assistance = 1    Respiratory Pattern: Regular Pattern; RR 8-20 = 0    Breath Sounds: Clear = 0    Sputum   ,  ,    Cough: Strong, spontaneous, non-productive = 0    Vital Signs   /83   Pulse 82   Temp 97.8 °F (36.6 °C) (Axillary)   Resp 20   Ht 5' 6\" (1.676 m)   Wt 129 lb 10.1 oz (58.8 kg)   SpO2 90%   BMI 20.92 kg/m²   SPO2 (COPD values may differ): 90-91% on room air or greater than 92% on FiO2 24- 28% = 1    Peak Flow (asthma only): not applicable = 0    RSI: 0-4 = See once and convert to home regimen or discontinue        Plan       Goals: medication delivery and improve oxygenation    Patient/caregiver was educated on the proper method of use for Respiratory Care Devices:  Yes      Level of patient/caregiver understanding able to:   ? Verbalize understanding   ? Demonstrate understanding       ? Teach back        ? Needs reinforcement       ? No available caregiver               ? Other:     Response to education:  Excellent     Is patient being placed on Home Treatment Regimen? Yes     Does the patient have everything they need prior to discharge?   Yes     Comments: chart reviewed    Plan of Care: hhn duoneb prn    Electronically signed by Alejandrina Goode RCP on 5/16/2019 at 10:18 PM    Respiratory Protocol Guidelines     1. Assessment and treatment by Respiratory Therapy will be initiated for medication and therapeutic interventions upon initiation of aerosolized medication. 2. Physician will be contacted for respiratory rate (RR) greater than 35 breaths per minute. Therapy will be held for heart rate (HR) greater than 140 beats per minute, pending direction from physician. 3. Bronchodilators will be administered via Metered Dose Inhaler (MDI) with spacer when the following criteria are met:  a. Alert and cooperative     b. HR < 140 bpm  c. RR < 30 bpm                d. Can demonstrate a 2-3 second inspiratory hold  4. Bronchodilators will be administered via Hand Held Nebulizer [facility]) to patients when ANY of the following criteria are met  a. Incognizant or uncooperative          b. Patients treated with HHN at Home        c. Unable to demonstrate proper use of MDI with spacer     d. RR > 30 bpm   5. Bronchodilators will be delivered via Metered Dose Inhaler (MDI), HHN, Aerogen to intubated patients on mechanical ventilation. 6. Inhalation medication orders will be delivered and/or substituted as outlined below. Aerosolized Medications Ordering and Administration Guidelines:    1. All Medications will be ordered by a physician, and their frequency and/or modality will be adjusted as defined by the patients Respiratory Severity Index (RSI) score. 2. If the patient does not have documented COPD, consider discontinuing anticholinergics when RSI is less than 9.  3. If the bronchospasm worsens (increased RSI), then the bronchodilator frequency can be increased to a maximum of every 4 hours. If greater than every 4 hours is required, the physician will be contacted.   4. If the bronchospasm improves, the frequency of the bronchodilator can be decreased, based on the patient's RSI, but not less than home treatment regimen frequency. 5. Bronchodilator(s) will be discontinued if patient has a RSI less than 9 and has received no scheduled or as needed treatment for 72  Hrs. Patients Ordered on a Mucolytic Agent:    1. Must always be administered with a bronchodilator. 2. Discontinue if patient experiences worsened bronchospasm, or secretions have lessened to the point that the patient is able to clear them with a cough. Anti-inflammatory and Combination Medications:    1. If the patient lacks prior history of lung disease, is not using inhaled anti-inflammatory medication at home, and lacks wheezing by examination or by history for at least 24 hours, contact physician for possible discontinuation.

## 2019-05-17 NOTE — PLAN OF CARE
Pt remains free of falls thus far this shift. All fall precautions in place and functioning properly. For patient safety, Visual Surveillance is in place, reviewed with patient/family, verbalized understanding.

## 2019-05-17 NOTE — PROGRESS NOTES
4 Eyes Admission Assessment     I agree as the admission nurse that 2 RN's have performed a thorough Head to Toe Skin Assessment on the patient. ALL assessment sites listed below have been assessed on admission. Areas assessed by both nurses:   [x]   Head, Face, and Ears   [x]   Shoulders, Back, and Chest  [x]   Arms, Elbows, and Hands   [x]   Coccyx, Sacrum, and Ischum  [x]   Legs, Feet, and Heels        Does the Patient have Skin Breakdown? No. Abrasion to left elbow, generalized bruising, bruising to bilateral shin, bruising to left arm, bruising to bilateral hands, chest tube removal site to mid left back , covered with dry dressing and Tegaderm, small incision site UYEN. Buttocks red and slow to jhonny.     Antonio Prevention initiated:  YES  Wound Care Orders initiated:  YES      [facility] nurse consulted for Pressure Injury (Stage 3,4, Unstageable, DTI, NWPT, and Complex wounds):  No    Nurse 1 eSignature: Electronically signed by Nallely Diallo RN on 5/17/2019 at 1:14 AM    **SHARE this note so that the co-signing nurse is able to place an eSignature**    Nurse 2 eSignature: Electronically signed by Damian Schilling RN on 5/17/2019 at 1:16 AM

## 2019-05-17 NOTE — PROGRESS NOTES
Welcome Meeting    Patient Goal:    Nursing Manager met with Isabella Perdue on 5/17/2019 to discuss patient goals, barriers to home d/c and what to expect during the ARU stay, as well as review information included in the Welcome binder for ARU. Goals for patient's stay will be documented in plan of care, as well as placed on the patient's whiteboard in room.       Staff Present for meeting:  MONICA Neal, RN

## 2019-05-17 NOTE — FLOWSHEET NOTE
05/17/19 1153   Encounter Summary   Services provided to: Patient   Referral/Consult From: Rounding   Continue Visiting   (es 5/17)   Complexity of Encounter Moderate   Length of Encounter 15 minutes   Spiritual/Lutheran   Type Spiritual support   Assessment Approachable   Intervention Active listening;Prayer   Outcome Receptive;Engaged in conversation

## 2019-05-17 NOTE — CONSULTS
Nutrition Assessment (Low Risk)    Type and Reason for Visit: Initial, Consult      Nutrition Assessment:  Patient assessed for nutritional risk. Deemed to be at low risk at this time. Will continue to monitor for changes in status. Pt is a new ARU patient. She is nutritionally stable as evidenced by report of good appetite and no weight loss per pt. She reports she is tolerating a General diet. RD will continue to monitor nutritional adequacy and need for ONS.      Malnutrition Assessment:  · Malnutrition Status: No malnutrition    Nutrition Risk Level   Risk Level: Low    Nutrition Diagnosis:   · Problem: No nutrition diagnosis at this time    Nutrition Intervention:  Food and/or Delivery: Continue current diet  Nutrition Education/Counseling/Coordination of Care:  Continued Inpatient Monitoring      Electronically signed by Jacquelyn Torres RD, AUNDREA on 5/17/19 at 12:55 PM    MICHELLE AVILA, AUNDREA  Pager:  [phone]  Office:  [phone]

## 2019-05-17 NOTE — PLAN OF CARE
ARU PATIENT TREATMENT PLAN  [facility]  [address] [place], [address]  [address]    : 1950  [facility]  MRN: [id number]   PHYSICIAN:  Dena Gustafson MD  Primary Problem    Patient Active Problem List   Diagnosis    Esophageal varices in other disease    Hemochromatosis    DM type 2 (diabetes mellitus, type 2) ([address]    GERD (gastroesophageal reflux disease)    [facility]rombocytopenia [facility] [facility]    Portal hypertension 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 [address]    Bleeding esophageal varices ([facility] [address]    Multiple trauma       Rehabilitation Diagnosis:  14.9 Other Mult Trauma  Other Multiple Trauma (multi-system no BI or SCI   ADMIT DATE:2019    Patient Goals: Regain strength and endurance, return home  Admitting Impairments: Multi-Trauma impacting motor function  Activity Restrictions: Reduced independence with ADL, transfers, mobility  Participation Limitations: Supervises group home, Enjoys reading, play on ipad , meeting with friends, cook        CARE PLAN     NURSING:  Avani Romero while on this unit will:   [x] Be continent of bowel and bladder      [x] Have an adequate number of bowel movements   [x] Urinate with no urinary retention >300ml in bladder   [x] Complete bladder protocol with bran removal   [x] Maintain O2 SATs at ___%   [x] Have pain managed while on ARU        [x] Be pain free by discharge    [] Have no skin breakdown while on ARU   [x] Have improved skin integrity via wound measurements   [x] Have no signs/symptoms of infection at the wound site  [x] Be free from injury during hospitalization   [] Complete education with patient/family with understanding demonstrated for:  [] Adjustment   [] Other:   Nursing Interventions will include:  [x] bowel/bladder training   [x] education for medical assistive devices   [x] medication education   [x] O2 saturation management   [] energy conservation   [] stress management techniques   [x] fall prevention   [] alarms protocol   [] seating and positioning   [x] skin/wound care   [] pressure relief instruction   [] dressing changes     [] infection protection   [x] DVT prophylaxis  [x] assistance with in room safety with transfers to bed, toilet, wheelchair, shower   [x] bathroom activities and hygiene  [] Other:    Patient/Caregiver Education for:   [] Disease/sustained injury/management      [x] Medication Use   [] Surgical intervention   [x] Safety   [x] Body mechanics and or joint protection   [] Health maintenance      [] Other:     PHYSICAL THERAPY:  Goals:                  Short term goals  Time Frame for Short term goals: 7 days   Short term goal 1: Ind bed mobility  Short term goal 2: Ind with transf excluding floor transf  Short term goal 3: Ind with amb with LRAD >300' at a time  Short term goal 4: Amb up and down 6 steps with MI ( note, pt reports that she does not \"do\" steps other than to get into home. The 6 step goal is a strengthening goal also)            Long term goals  Time Frame for Long term goals : STG=LTG  These goals were reviewed with this patient at the time of assessment and Luisa Le is in agreement.      Plan of Care: Pt to be seen 5 out of 7 days per week per ARU protocol (   minutes with PT)                  Current Treatment Recommendations: Endurance Training, Wheelchair Mobility Training, Transfer Training, Functional Mobility Training, Safety Education & Training, Positioning, Balance Training, Pain Management, Stair training, Gait Training, Strengthening, Patient/Caregiver Education & Training    OCCUPATIONAL THERAPY:  Goals:             Short term goals  Time Frame for Short term goals: STG=LTG  :  Long term goals  Time Frame for Long term goals : 7 days  Long term goal 1: Pt will be independent w/ LE dressing  Long term goal 2: Pt will be independent w/ UE dressing including donning TLSO brace  Long term goal 3: Pt will tolerate stance for 10 mins MOD I to complete meal prep task   Long term goal 4: Pt will be MOD I w/ tub transfer   Long term goal 5: Pt will complete bathing tasks MOD I  :  These goals were reviewed with this patient at the time of assessment and Jorge Padilla is in agreement    Plan of Care:  Pt to be seen 5 out of 7 days per week per ARU protocol (90   minutes with OT)  Patient Education: Role of OT, oriented to ARU, safe transfers and RW management-pt verb understanding     SPEECH THERAPY: Goals will be left blank if speech is not following this patient. Goals: These goals were reviewed with this patient at the time of assessment and Jorge Padilla is in agreement    Plan of Care:  Pt to be seen 5 out of 7 days per week per ARU protocol (    minutes with SLP)    Therapy Treatments will include:  [x]  therapeutic exercises    [x]  gait training     []  neuromuscular re-ed                            [x]  transfer training             [] community reintegration    [x] bed mobility                          []  w/c mobility and training  [x]  self care    [x]home mgmt    []  cognitive training            []  energy conservation        []  dysphagia tx    []  speech/language/communication therapy   []  group therapy    [x]  patient/family education    [] Other:    CASE MANAGEMENT:  Goals:   Assist patient/family with discharge planning, patient/family counseling,   and coordination with insurance during ARU stay.     Admission Period/Goal FIM SCORES  Admit Score Goal Score   Eatin - Patient feeds self GOAL: Eatin   Groomin - Able to perform 3-4 tasks with touching help GOAL: Groomin   Bathin - Able to bathe 8-9 areas GOAL: Bathin Dressing-Upper: (hospital gown only ) GOAL: Dressing-Upper: 7   Dressing-Lower: 4 - Requires assist with buttons/zippers/shoelaces and/or assist with shoes only(socks and underwear only) GOAL: Dressing-Lower: 7   Toiletin - Requires steadying assistance only GOAL: Toiletin   Bladder Frequency of Accidents: ((0) no value) GOAL: Bladder: 7     GOAL: Bowel: 7   Bed, Chair, Wheel Chair: 4 - Requires steadying assistance only <25% assist  and/or requires assist with one leg only GOAL: Bed, Chair, Wheel Chair: 7   Toilet Transfer: 4 - Requires steadying assistance only < 25% assist GOAL: Toilet: 7     GOAL: Tub, Shower: 6   Primary Mode: Walk    Distance Walked: 160'    Distance Traveled in [address] [place] [address] Street: 150'    Walk: 4 - Contact Guard/Minimal Assistance Requires up to [employer] or Minimal Assistance to walk at least 150 feet    Wheel Chair: 5 - [address] standby supervision or cuing to operate wheelchair at least 150 feet    FIM:    FIM: GOAL: Walk/Wheel Chair: 7   Stairs: 1- Total Assistance perfoms less than 25% of the effort, or requirs the assistance of two people, or goes up and down fewer than 4 stairs GOAL: Stairs: 2   Comprehension: 6 - Complex ideas 90% or device (hearing aid/glasses) GOAL: Comprehension: 6   Expression: 6 - Device used to express complex ideas/needs GOAL: Expression: 7   Social Interaction: 6 - Patient requires medication for mood and/or effect GOAL: Social Interaction: 6   Problem Solvin - Patient able to solve simple/routine tasks GOAL: Problem Solvin   Memory: 6 - Patient requires device to recall (e.g. memory book) GOAL: Memory: [address] Benigno Briseno will be seen a minimum of 3 hours of therapy per day, a minimum of 5 out of 7 days per week  (please see above for specific treatment plan per PT/OT/SLP).     [] In this rare instance due to the nature of this patient's medical involvement, this patient will be seen 15 hours per week (900 minutes within a 7 day period). In addition, dietician/nutritionist may monitor calorie count as well as intake and collaboratively work with SLP on dietary upgrades. Neuropsychology/Psychology may evaluate and provide necessary support. Medical issues being managed closely and that require 24 hour availability of a physician:   [] Swallowing Precautions  [x] Bowel/Bladder Fx  [x] Weight bearing precautions   [] Wound Care    [x] Pain Mgmt   [] Infection Protection   [x] DVT Prophylaxis   [x] Fall Precautions  [x] Fluid/Electrolyte/Nutrition Balance   [] Voice Protection   [x] Respiratory  [] Other:    Medical Prognosis: [] Good  [x] Fair    [] Guarded   Total expected IRF days 11  Anticipated discharge destination:    [x] Home Independently   [] Home Modified Independent  [] Home with supervision    []SNF     [] Other                                           Physician anticipated functional outcomes: Independent with ADL, transfers, mobility    IPOC brief synthesis: 70-year-old female with a 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 Imaging revealed left anterior 3-9 and left posterior 11th rib fractures. She required placement of chest tube which was performed 5/6 and subsequently removed on 5/14. She was also noted to have an anterior T3 wedge fracture and was suggested to utilize a TLSO when out of bed. She was evaluated by therapy suggested to continue in an inpatient setting prior to returning home.       she was admitted with the above goal      I have reviewed this initial plan of care and agree with its contents:    Title   Name    Date    Time    Physician: Electronically signed by Fransico Hess MD on 5/17/2019 at 6:25 PM      Case Mgmt: ASHWIN Brody, LSW 5/17/19 0824    OT: Gabby Unger, OT  5/17/19    1625     PT: Letty JACOBO #[id number] 5/17/2019 @5792    RN: Papo Pantoja RN, 5/17/2019 12:51 BIRDIE  ST:    :  Alexandra Jamison 5/17/19 8040    Other:

## 2019-05-17 NOTE — PROGRESS NOTES
Occupational Therapy   Occupational Therapy Initial Assessment/Treatment Note   Date: 2019   Patient Name: Cathi Goodell  MRN: [id number]     : 1950    Date of Service: 2019    Discharge Recommendations:  Home with assist PRN, Continue to assess pending progress  OT Equipment Recommendations  Equipment Needed: No  Other: Pt has a shower chair. Continue to assess pending progress     Assessment   Performance deficits / Impairments: Decreased functional mobility ; Decreased safe awareness;Decreased balance;Decreased ADL status; Decreased high-level IADLs  Assessment: Pt is a 80 yo female who presents to [facility] s/p falling down concrete stairs resulting in multiple fractures to L anterolateral and posterior ribs and T3 wedge fracture. Pt is currently functioning below baseline and is requiring increased assistance for ADLs and functional mobility. Prior to injury pt was working full time and was independent w/ all ADLs and IADLs. Pt benefits from OT in order to maximize functional independence. Treatment Diagnosis: Decreased ADL status 2/2 T3 wedge fracture, anterolateral 3rd-9th rib fractures, posterior 11th rib fracture   Prognosis: Good  Decision Making: Low Complexity  Patient Education: Role of OT, oriented to ARU, safe transfers and RW management-pt verb understanding   REQUIRES OT FOLLOW UP: Yes  Activity Tolerance  Activity Tolerance: Patient Tolerated treatment well  Activity Tolerance: Increased time needed for toileting   Safety Devices  Safety Devices in place: Yes  Type of devices: Call light within reach; Chair alarm in place; Left in chair;Nurse notified           Patient Diagnosis(es): There were no encounter diagnoses.      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, COPD (chronic obstructive pulmonary disease) ([facility] [address] Diabetes mellitus ([facility] [facility] End stage liver disease ([facility] [address] [facility] Esophageal varices with bleeding(456.0), GERD (gastroesophageal reflux disease), GERD (gastroesophageal reflux disease), History of blood transfusion, Hyperlipidemia, Hypertension, IBS (irritable bowel syndrome), Liver disease, NSTEMI (non-ST elevated myocardial infarction) ([facility] [address] Pneumonia, Rectal varices, and Unspecified diseases of blood and blood-forming organs. has a past surgical history that includes Cholecystectomy ();  section (); Upper gastrointestinal endoscopy (11); Colonoscopy; Colonoscopy; Upper gastrointestinal endoscopy (); Upper gastrointestinal endoscopy (2011); Upper gastrointestinal endoscopy (11); Upper gastrointestinal endoscopy (11); Endoscopy, colon, diagnostic; Upper gastrointestinal endoscopy (10-6-14); Colonoscopy (11/10/14); Upper gastrointestinal endoscopy (11/18/15); and fracture surgery. Treatment Diagnosis: Decreased ADL status 2/2 T3 wedge fracture, anterolateral 3rd-9th rib fractures, posterior 11th rib fracture       Restrictions  Restrictions/Precautions  Required Braces or Orthoses?: Yes  Required Braces or Orthoses  Spinal: Thoracic Lumbar Sacral Orthotics  Position Activity Restriction  Other position/activity restrictions: TLSO OOB. Pt may shower w/o TLSO. No lifting more than 10lbs. No bending, twisting, pushing or pulling. Subjective   General  Chart Reviewed: Yes  Patient assessed for rehabilitation services?: Yes  Additional Pertinent Hx: Pt is a 78 yo female who presents to [facility]  from [facility] s/p fall down concrete steps resulting in L anterolateral 3rd-9th rib fractures, T3 wedge fracture, and posterior 11th rib fracture. Pt's hospital stay was complicated by respiratory distress now requiring 4L O2. Pt has PMHx including: DM, GERD, high cholesterol, hypertension, IBS, hemochromatosis, liver transplant.  Pt admitted to ARU   Family / Caregiver Present: No  Referring Practitioner: Dr Nohemi Olea   Diagnosis: L anterolateral 3rd-9th rib fractures, T3 wedge fracture, and posterior 11th rib fracture  Subjective  Subjective: Pt was seated in recliner chair upon arrival. Pt pleasant and agreeable to OT evaluation      Social/Functional History  Social/Functional History  Lives With: Son(son works 4:30am -2:00 each day)  Type of Home: House  Home Layout: One level, Laundry in basement  Home Access: Stairs to enter with rails  Entrance Stairs - Number of Steps: 3+1  ( to porch -2 different ways to enter each has 1 railing )  Entrance Stairs - Rails: Both  Bathroom Shower/Tub: Shower chair with back, Tub/Shower unit  Bathroom Toilet: Standard  Bathroom Equipment: Grab bars in shower(Uses sink to get up off the commode)  Bathroom Accessibility: Accessible  Home Equipment: Grab bars  Receives Help From: Family  ADL Assistance: [place]: Independent  Homemaking Responsibilities: Yes(son does laundry)  Ambulation Assistance: Independent  Transfer Assistance: Independent  Active : Yes  Mode of Transportation: Car  Education: Masters in [place]  Occupation: Part time employment  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 IVs to B hands. Scattered bruising to ribs and back. 4L O2 requirement. Balance  Standing Balance: Contact guard assistance(CGA progressing to SBA)  Standing Balance  Time: ~4 mins total  Activity: functional mobility to/from bathroom, stance for hand hygiene  Functional Mobility  Functional - Mobility Device: Rolling Walker  Activity: To/from bathroom  Assist Level: Contact guard assistance  Functional Mobility Comments: VCs needed for RW management as pt attempted to abandon multiple times.  VCs to prevent tangling self in O2 cord   Toilet Transfers  Toilet - Technique: Ambulating  Equipment Used: Standard toilet  Toilet Transfer: Contact guard assistance  Toilet Transfers Comments: VCs for safe descent due to low height of toilet   ADL  Grooming: Stand by assistance(Stance at sink to wash hands. )  Toileting: Contact guard assistance(Increased time needed for toileting. Pt managed underwear up and down w/ use of grab bar for UE support)  Additional Comments: Pt initially very impulsive w/ RW and abandoned RW multiple times in the bathroom. Pt was tangling self in O2 cord and required cues for safety awareness. Transfers  Sit to stand: Contact guard assistance  Stand to sit: Stand by assistance     Cognition  Overall Cognitive Status: [facility]  Cognition Comment: Pt is mildly impulsive at times                  LUE AROM (degrees)  LUE AROM : Exceptions  LUE General AROM: LUE ROM limited due to sharp shooting pain down ribs w/ movement   RUE AROM (degrees)  RUE AROM : WFL                    2nd session: Pt was supine in bed upon arrival. Pt w/ c/o pain to ribs and requested pain meds. RN present to deliver meds and change lidoderm patch to ribs. Pt was upset that she was only allowed to sponge bathe but was agreeable to complete. RN communicated to MD in attempt to clarify orders. (Note: at time of session MD had not returned phone call. As of 1500 5/17 MD returned call and pt is now able to shower w/o TLSO). Supine to sit completed w/ HOB elevated and CGA at trunk. Increased time needed due to pain. Pt w/ increased pain w/ movement onto L side. Once seated EOB pt requested to wash chest and back. Pt washed BUEs, chest, and abdomen. Assist needed to wash back. Pt w/ no clothes from home present. Hospital gown donned and assist needed to don TLSO. Sit to stand from EOB to RW w/ SBA. Pt ambulated w/ RW and SBA to bathroom. Transfer to/from TTB w/ SBA. Seated on TTB at sink pt wash BLEs and darcie area w/ CGA progressing to SBA. Pt requested to wash hair.  Pt situated close to shower head and OT assisted w/ washing pt's hair to

## 2019-05-17 NOTE — CARE COORDINATION
2019  [facility])  Clinical Case Management Department    Patient is a very pleasant 80 y/o female who lives with her adult son in a 1 level house in [place], [place]. Patient has used a hhc agency previously, after her liver transplant, but states \"this is my therapy, here\" referring to ARU and she states she does not want hhc nor is she interested in OP therapies at d/c. Patient owns a shower chair and grab bars, but no other DME. 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 She is currently employed. SW will continue to follow to update patient and assist with discharge planning. Patient: Josee Sheehan  MRN: [id number] / : 1950  ACCT: [de-identified]     Emergency Contacts  Healthcare Agent Appointed: Sergei Quintero Agent's Name:   Oleg Arreaga (son); Rachelle Cabello (sister)  Healthcare Agent's Phone Number: [phone]    Admission Documentation  Attending Provider: Shyam Mccarthy MD  Admit date/time: 2019  7:51 PM  Status: Inpatient [101]  Diagnosis: Multiple trauma     Readmission within last 30 days:  no     Living Situation  Discharge Planning  Living Arrangements: Alone  Support Systems: Children, Family Members, Other (Comment)  Potential Assistance Needed: N/A  Type of Home Care Services: None  Patient expects to be discharged to[de-identified] Home  Expected Discharge Date: TBD    Service Assessment       Values / Beliefs  Do you have any ethnic, cultural, sacramental, or spiritual Sabianism needs you would like us to be aware of while you are in the hospital?: No    Advance Directives (For Healthcare)  Healthcare Directive: Yes, patient has an advance directive for healthcare treatment  Type of Healthcare Directive: Durable power of  for health care  Copy in Chart: No, copy requested from family  Healthcare Agent Appointed: [address] Agent's Name:   Oleg Arreaga (son);  Rachelle Cabello

## 2019-05-18 PROCEDURE — 94761 N-INVAS EAR/PLS OXIMETRY MLT: CPT

## 2019-05-18 PROCEDURE — 6370000000 HC RX 637 (ALT 250 FOR IP): Performed by: PHYSICAL MEDICINE & REHABILITATION

## 2019-05-18 PROCEDURE — 97530 THERAPEUTIC ACTIVITIES: CPT

## 2019-05-18 PROCEDURE — 97110 THERAPEUTIC EXERCISES: CPT

## 2019-05-18 PROCEDURE — 1280000000 HC REHAB R&B

## 2019-05-18 PROCEDURE — 97116 GAIT TRAINING THERAPY: CPT

## 2019-05-18 PROCEDURE — 6360000002 HC RX W HCPCS: Performed by: PHYSICAL MEDICINE & REHABILITATION

## 2019-05-18 PROCEDURE — 97535 SELF CARE MNGMENT TRAINING: CPT

## 2019-05-18 PROCEDURE — 94640 AIRWAY INHALATION TREATMENT: CPT

## 2019-05-18 RX ORDER — POLYETHYLENE GLYCOL 3350 17 G/17G
17 POWDER, FOR SOLUTION ORAL DAILY PRN
Status: DISCONTINUED | OUTPATIENT
Start: 2019-05-18 | End: 2019-05-24 | Stop reason: HOSPADM

## 2019-05-18 RX ADMIN — OXYCODONE HYDROCHLORIDE 5 MG: 5 TABLET ORAL at 20:31

## 2019-05-18 RX ADMIN — GABAPENTIN 300 MG: 300 CAPSULE ORAL at 16:21

## 2019-05-18 RX ADMIN — TACROLIMUS 2 MG: 1 CAPSULE ORAL at 08:46

## 2019-05-18 RX ADMIN — OXYCODONE HYDROCHLORIDE 5 MG: 5 TABLET ORAL at 06:30

## 2019-05-18 RX ADMIN — ENOXAPARIN SODIUM 40 MG: 40 INJECTION SUBCUTANEOUS at 08:47

## 2019-05-18 RX ADMIN — OXYCODONE HYDROCHLORIDE 5 MG: 5 TABLET ORAL at 13:02

## 2019-05-18 RX ADMIN — CITALOPRAM HYDROBROMIDE 40 MG: 20 TABLET ORAL at 08:47

## 2019-05-18 RX ADMIN — GABAPENTIN 300 MG: 300 CAPSULE ORAL at 08:47

## 2019-05-18 RX ADMIN — METHOCARBAMOL TABLETS 1000 MG: 500 TABLET, COATED ORAL at 20:48

## 2019-05-18 RX ADMIN — MYCOPHENOLATE MOFETIL 500 MG: 500 TABLET, FILM COATED ORAL at 08:47

## 2019-05-18 RX ADMIN — IPRATROPIUM BROMIDE AND ALBUTEROL SULFATE 1 AMPULE: .5; 3 SOLUTION RESPIRATORY (INHALATION) at 14:09

## 2019-05-18 RX ADMIN — GABAPENTIN 300 MG: 300 CAPSULE ORAL at 20:48

## 2019-05-18 RX ADMIN — TACROLIMUS 2 MG: 1 CAPSULE ORAL at 20:49

## 2019-05-18 RX ADMIN — PROPRANOLOL HYDROCHLORIDE 10 MG: 20 TABLET ORAL at 20:48

## 2019-05-18 RX ADMIN — CHOLECALCIFEROL (VITAMIN D3) 10 MCG (400 UNIT) TABLET 1000 UNITS: at 08:47

## 2019-05-18 RX ADMIN — PROPRANOLOL HYDROCHLORIDE 10 MG: 20 TABLET ORAL at 08:47

## 2019-05-18 RX ADMIN — METHOCARBAMOL TABLETS 1000 MG: 500 TABLET, COATED ORAL at 08:46

## 2019-05-18 RX ADMIN — MYCOPHENOLATE MOFETIL 500 MG: 500 TABLET, FILM COATED ORAL at 20:50

## 2019-05-18 RX ADMIN — ASPIRIN 81 MG 81 MG: 81 TABLET ORAL at 08:47

## 2019-05-18 RX ADMIN — PRAVASTATIN SODIUM 10 MG: 10 TABLET ORAL at 20:49

## 2019-05-18 RX ADMIN — METHOCARBAMOL TABLETS 1000 MG: 500 TABLET, COATED ORAL at 16:21

## 2019-05-18 ASSESSMENT — PAIN DESCRIPTION - PAIN TYPE
TYPE: ACUTE PAIN

## 2019-05-18 ASSESSMENT — PAIN DESCRIPTION - ONSET
ONSET: ON-GOING

## 2019-05-18 ASSESSMENT — PAIN DESCRIPTION - DESCRIPTORS
DESCRIPTORS: SHARP

## 2019-05-18 ASSESSMENT — PAIN DESCRIPTION - FREQUENCY
FREQUENCY: CONTINUOUS
FREQUENCY: INTERMITTENT

## 2019-05-18 ASSESSMENT — PAIN DESCRIPTION - PROGRESSION
CLINICAL_PROGRESSION: NOT CHANGED

## 2019-05-18 ASSESSMENT — PAIN SCALES - GENERAL
PAINLEVEL_OUTOF10: 8
PAINLEVEL_OUTOF10: 8
PAINLEVEL_OUTOF10: 6
PAINLEVEL_OUTOF10: 6
PAINLEVEL_OUTOF10: 5
PAINLEVEL_OUTOF10: 6

## 2019-05-18 ASSESSMENT — PAIN DESCRIPTION - LOCATION
LOCATION: BACK;RIB CAGE
LOCATION: RIB CAGE
LOCATION: RIB CAGE
LOCATION: BACK;RIB CAGE
LOCATION: RIB CAGE
LOCATION: RIB CAGE

## 2019-05-18 ASSESSMENT — PAIN DESCRIPTION - ORIENTATION
ORIENTATION: LEFT

## 2019-05-18 ASSESSMENT — PAIN - FUNCTIONAL ASSESSMENT
PAIN_FUNCTIONAL_ASSESSMENT: PREVENTS OR INTERFERES SOME ACTIVE ACTIVITIES AND ADLS

## 2019-05-18 NOTE — PROGRESS NOTES
Patient up in chair eating breakfast. A/Ox4. On 4L NC. Fall precautions in place. For patient safety, Visual Surveillance is in place, reviewed with patient/family, verbalized understanding. Will continue to monitor.      Vitals:    05/18/19 0844   BP: (!) 119/58   Pulse: 90   Resp: 20   Temp: 97.9 °F (36.6 °C)   SpO2: (!) 88%

## 2019-05-18 NOTE — PLAN OF CARE
Problem: Falls - Risk of:  Goal: Will remain free from falls  Description  Will remain free from falls  5/18/2019 0242 by Waqar Rosenthal RN  Note:   Remains free from falls. Call light within reach and alarms in use at all times. SBA for transfers. Brace when OOB or in bed above 30 degrees. 5/17/2019 1409 by Qiana Richard RN  Outcome: Ongoing     Problem: Pain:  Goal: Control of acute pain  Description  Control of acute pain  Note:   C/O back and rib pain. Controlled with Robaxin and PRN Oxycodone.

## 2019-05-18 NOTE — PROGRESS NOTES
Occupational Therapy  Facility/Department: [facility] ACUTE REHAB UNIT  Daily Treatment Note  NAME: Drake Regan  : 1950  MRN: [id number]    Date of Service: 2019    Discharge Recommendations:  Home with assist PRN, Continue to assess pending progress  OT Equipment Recommendations  Equipment Needed: No  Other: Pt has a shower chair. Continue to assess pending progress     Assessment   Performance deficits / Impairments: Decreased functional mobility ; Decreased safe awareness;Decreased balance;Decreased ADL status; Decreased high-level IADLs  Assessment: Pt shower transfer SBA and functional mobility SBA no device. Pt bathed UB (setup/Mod I) and LB (setup/SBA). Pt dressed Min A UB to thread LUE in bra and dressed LB SBA/setup. Pt benefits from OT in order to maximize functional independence. Treatment Diagnosis: Decreased ADL status 2/2 T3 wedge fracture, anterolateral 3rd-9th rib fractures, posterior 11th rib fracture   Prognosis: Good  Patient Education: safe transfers  REQUIRES OT FOLLOW UP: Yes  Activity Tolerance  Activity Tolerance: Patient Tolerated treatment well  Safety Devices  Safety Devices in place: Yes  Type of devices: Call light within reach; Chair alarm in place; Left in chair;Nurse notified; All fall risk precautions in place         Patient Diagnosis(es): There were no encounter diagnoses. 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, COPD (chronic obstructive pulmonary disease) ([facility] [address] Diabetes mellitus ([facility] [address] [facility] End stage liver disease ([facility] [address] Esophageal varices with bleeding(456.0), GERD (gastroesophageal reflux disease), GERD (gastroesophageal reflux disease), History of blood transfusion, Hyperlipidemia, Hypertension, IBS (irritable bowel syndrome), Liver disease, NSTEMI (non-ST elevated myocardial infarction) ([facility] [address] Pneumonia, Rectal varices, and Unspecified diseases of blood and blood-forming organs.    has a past surgical history that includes Cholecystectomy ();  section (); Upper gastrointestinal endoscopy (11); Colonoscopy; Colonoscopy; Upper gastrointestinal endoscopy (); Upper gastrointestinal endoscopy (2011); Upper gastrointestinal endoscopy (11); Upper gastrointestinal endoscopy (11); Endoscopy, colon, diagnostic; Upper gastrointestinal endoscopy (10-6-14); Colonoscopy (11/10/14); Upper gastrointestinal endoscopy (11/18/15); and fracture surgery. Restrictions  Restrictions/Precautions  Required Braces or Orthoses?: Yes  Required Braces or Orthoses  Spinal: Thoracic Lumbar Sacral Orthotics  Position Activity Restriction  Other position/activity restrictions: TLSO OOB. Pt may shower w/o TLSO. No lifting more than 10lbs. No bending, twisting, pushing or pulling. Subjective   General  Chart Reviewed: Yes  Patient assessed for rehabilitation services?: Yes  Additional Pertinent Hx: Pt is a 80 yo female who presents to [facility]  from [facility] s/p fall down concrete steps resulting in L anterolateral 3rd-9th rib fractures, T3 wedge fracture, and posterior 11th rib fracture. Pt's hospital stay was complicated by respiratory distress now requiring 4L O2. Pt has PMHx including: DM, GERD, high cholesterol, hypertension, IBS, hemochromatosis, liver transplant. Pt admitted to ARU   Response to previous treatment: Patient with no complaints from previous session  Family / Caregiver Present: No  Referring Practitioner: Dr Holloway Friend   Diagnosis: L anterolateral 3rd-9th rib fractures, T3 wedge fracture, and posterior 11th rib fracture  Subjective  Subjective: Pt supine in bed upon entry TLSO donned, pleasant and agreeable to therapy session. General Comment  Comments: Pt supine to sit Supervision. Pt sit to stand SBA and pt ambulated no device SBA ~20 ft to shower in bathroom. Pt shower transfer SBA. Pt washed UB (setup/Mod I) and wash LB (setup/SBA) - pt also shaved BLEs.  Pt dressed UB - bra/shirt (Min A for LUE in bra) BLEs into bed. Pt with 2lb weighted dowel nancy performed the following UB exercises with rest breaks between each set due to fatigue: 12 bicep curls x 3 sets, 20 wrist flexion/extension x 3 sets, 20 triceps press x 3 sets. At end of therapy session call light in reach and bed alarm on.                                Plan   Plan  Times per week: 5x a week for 90 mins daily   Times per day: Daily  Current Treatment Recommendations: Strengthening, Endurance Training, Patient/Caregiver Education & Training, Self-Care / ADL, Balance Training, Home Management Training, Safety Education & Training  G-Code     OutComes Score                                                  AM-PAC Score             Goals  Short term goals  Time Frame for Short term goals: STG=LTG   Long term goals  Time Frame for Long term goals : 7 days  Long term goal 1: Pt will be independent w/ LE dressing  Long term goal 2: Pt will be independent w/ UE dressing including donning TLSO brace  Long term goal 3: Pt will tolerate stance for 10 mins MOD I to complete meal prep task   Long term goal 4: Pt will be MOD I w/ tub transfer   Long term goal 5: Pt will complete bathing tasks MOD I - goal met UB 5/18  Patient Goals   Patient goals : \"get out of here in a week\" \"get my balance\"        Therapy Time   Individual Concurrent Group Co-treatment   Time In 0730         Time Out 0830         Minutes 60         Timed Code Treatment Minutes: 60 Minutes       Therapy Time   Individual Concurrent Group Co-treatment   Time In 1330         Time Out 1400         Minutes 30         Timed Code Treatment Minutes: [address] [facility], [address] [place] [address]

## 2019-05-18 NOTE — PROGRESS NOTES
Physical Therapy  Facility/Department: [facility] ACUTE REHAB UNIT  Daily Treatment Note  NAME: Isabella Perdue  : 1950  MRN: [id number]    Date of Service: 2019    Discharge Recommendations:  Home independently, Home with Home health PT   PT Equipment Recommendations  Other: Ongoing assessment at this time    Patient Diagnosis(es): There were no encounter diagnoses. 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, COPD (chronic obstructive pulmonary disease) ([facility] [address] Diabetes mellitus ([facility] [address] End stage liver disease ([facility] [address] Esophageal varices with bleeding(456.0), GERD (gastroesophageal reflux disease), GERD (gastroesophageal reflux disease), History of blood transfusion, Hyperlipidemia, Hypertension, IBS (irritable bowel syndrome), Liver disease, NSTEMI (non-ST elevated myocardial infarction) ([facility] [address] Pneumonia, Rectal varices, and Unspecified diseases of blood and blood-forming organs. has a past surgical history that includes Cholecystectomy ();  section (); Upper gastrointestinal endoscopy (11); Colonoscopy; Colonoscopy; Upper gastrointestinal endoscopy (); Upper gastrointestinal endoscopy (2011); Upper gastrointestinal endoscopy (11); Upper gastrointestinal endoscopy (11); Endoscopy, colon, diagnostic; Upper gastrointestinal endoscopy (10-6-14); Colonoscopy (11/10/14); Upper gastrointestinal endoscopy (11/18/15); and fracture surgery. Restrictions  Restrictions/Precautions  Required Braces or Orthoses?: Yes  Required Braces or Orthoses  Spinal: Thoracic Lumbar Sacral Orthotics  Position Activity Restriction  Other position/activity restrictions: TLSO OOB. Pt may shower w/o TLSO. No lifting more than 10lbs. No bending, twisting, pushing or pulling. Subjective   General  Chart Reviewed:  Yes  Additional Pertinent Hx: Pt is 78 yo F admitted to [facility] ARU on 19 from acute stay at [facility] for multiple trauma following fall on Functions, Activity limitations: Decreased functional mobility ; Decreased endurance;Decreased balance; Increased Pain  Assessment: Pt with c/o L anterior chest wall soreness mid chest and braces it at times throughout therapy. Pt SOB with activity, requiring time to recover. Pt is motivated and cooperative and participates within her limitations. Will continue. Treatment Diagnosis: Multiple trauma resulting in decreased functional mobility  Prognosis: Good  Decision Making: Low Complexity  Patient Education: Role of PT- pt verbalized understanding  REQUIRES PT FOLLOW UP: Yes  Activity Tolerance  Activity Tolerance: Patient limited by endurance; Patient limited by pain     Goals  Short term goals  Time Frame for Short term goals: 7 days   Short term goal 1: Ind bed mobility  ONGOING  Short term goal 2: Ind with transf excluding floor transf  ONGOING  Short term goal 3: Ind with amb with LRAD >300' at a time  ONGOING  Short term goal 4: Amb up and down 6 steps with MI (note, pt reports that she does not \"do\" steps other than to get into home.  The 6 step goal is a strengthening goal also)  ONGOING  Long term goals  Time Frame for Long term goals : STG=LTG  Patient Goals   Patient goals : Decrease pain, Home w/o O2     Plan    Plan  Times per week: 5-7x week x 90 minutes  Times per day: Daily  Current Treatment Recommendations: Endurance Training, Wheelchair Mobility Training, Transfer Training, Functional Mobility Training, Safety Education & Training, Positioning, Balance Training, Pain Management, Stair training, Gait Training, Strengthening, Patient/Caregiver Education & Training  Safety Devices  Type of devices: Call light within reach, Chair alarm in place, Left in chair, Nurse notified  Restraints  Initially in place: No     Therapy Time   Individual Concurrent Group Co-treatment   Time In 0930         Time Out 1030         Minutes 60           Second Session Therapy Time:   Individual Concurrent Group Co-treatment   Time In 1300         Time Out 1330         Minutes 30           Timed Code Treatment Minutes:  90    Total Treatment Minutes:  [address], [id number] [facility]

## 2019-05-18 NOTE — PROGRESS NOTES
Josee Gain  5/18/2019  [id number]    Chief Complaint: Multiple trauma    Subjective:   No overnight events. No current complaints. O2 requirement remains. Not on oxygen at home. Labs appropriate. ROS: No CP, SOB, dyspnea    Objective:  Patient Vitals for the past 24 hrs:   BP Temp Temp src Pulse Resp SpO2 Weight   05/18/19 0844 (!) 119/58 97.9 °F (36.6 °C) Oral 90 20 (!) 88 % --   05/18/19 0623 -- -- -- -- -- -- 123 lb 14.4 oz (56.2 kg)   05/17/19 2008 (!) 118/51 98.3 °F (36.8 °C) Oral 82 18 93 % --   05/17/19 0900 (!) 111/53 98.3 °F (36.8 °C) Oral 84 20 92 % --     Gen: No distress, pleasant. Resting in bed  HEENT: Normocephalic, atraumatic. CV: Regular rate and rhythm. No MRG  Resp: No respiratory distress. Coarse BS  Abd: Soft, nontender nondistended   Ext: No edema. Neuro: Alert, oriented, appropriately interactive. Laboratory data: Available via EMR. Therapy progress:  PT  Required Braces or Orthoses  Spinal: Thoracic Lumbar Sacral Orthotics  Position Activity Restriction  Other position/activity restrictions: TLSO OOB. Pt may shower w/o TLSO. No lifting more than 10lbs. No bending, twisting, pushing or pulling. Objective     Sit to Stand: Contact guard assistance  Stand to sit: Stand by assistance  Bed to Chair: Contact guard assistance(Able to take a few steps )  Stand Pivot Transfers: Contact guard assistance  Device: Rolling Walker  Other Apparatus: O2(4-5L )  Assistance: Contact guard assistance  Distance: 105', 160' short distances in the gym,   OT  PT Equipment Recommendations  Other: Ongoing assessment at this time  Toilet - Technique: Ambulating  Equipment Used: Standard toilet  Toilet Transfers Comments: VCs for safe descent due to low height of toilet   Assessment        SLP                Body mass index is 20 kg/m².     Assessment:  Patient Active Problem List   Diagnosis    Esophageal varices in other disease    Hemochromatosis    DM type 2 (diabetes mellitus,

## 2019-05-19 PROCEDURE — 6370000000 HC RX 637 (ALT 250 FOR IP): Performed by: PHYSICAL MEDICINE & REHABILITATION

## 2019-05-19 PROCEDURE — 6360000002 HC RX W HCPCS: Performed by: PHYSICAL MEDICINE & REHABILITATION

## 2019-05-19 PROCEDURE — 1280000000 HC REHAB R&B

## 2019-05-19 RX ADMIN — CHOLECALCIFEROL (VITAMIN D3) 10 MCG (400 UNIT) TABLET 1000 UNITS: at 09:21

## 2019-05-19 RX ADMIN — GABAPENTIN 300 MG: 300 CAPSULE ORAL at 09:20

## 2019-05-19 RX ADMIN — MYCOPHENOLATE MOFETIL 500 MG: 500 TABLET, FILM COATED ORAL at 09:21

## 2019-05-19 RX ADMIN — OXYCODONE HYDROCHLORIDE 5 MG: 5 TABLET ORAL at 05:17

## 2019-05-19 RX ADMIN — METHOCARBAMOL TABLETS 1000 MG: 500 TABLET, COATED ORAL at 12:53

## 2019-05-19 RX ADMIN — PROPRANOLOL HYDROCHLORIDE 10 MG: 20 TABLET ORAL at 21:20

## 2019-05-19 RX ADMIN — PROPRANOLOL HYDROCHLORIDE 10 MG: 20 TABLET ORAL at 09:21

## 2019-05-19 RX ADMIN — METHOCARBAMOL TABLETS 1000 MG: 500 TABLET, COATED ORAL at 09:21

## 2019-05-19 RX ADMIN — PRAVASTATIN SODIUM 10 MG: 10 TABLET ORAL at 21:21

## 2019-05-19 RX ADMIN — OXYCODONE HYDROCHLORIDE 5 MG: 5 TABLET ORAL at 12:40

## 2019-05-19 RX ADMIN — TACROLIMUS 2 MG: 1 CAPSULE ORAL at 09:22

## 2019-05-19 RX ADMIN — GABAPENTIN 300 MG: 300 CAPSULE ORAL at 12:53

## 2019-05-19 RX ADMIN — OXYCODONE HYDROCHLORIDE 5 MG: 5 TABLET ORAL at 21:21

## 2019-05-19 RX ADMIN — METHOCARBAMOL TABLETS 1000 MG: 500 TABLET, COATED ORAL at 21:20

## 2019-05-19 RX ADMIN — CITALOPRAM HYDROBROMIDE 40 MG: 20 TABLET ORAL at 09:21

## 2019-05-19 RX ADMIN — GABAPENTIN 300 MG: 300 CAPSULE ORAL at 21:21

## 2019-05-19 RX ADMIN — TACROLIMUS 2 MG: 1 CAPSULE ORAL at 21:21

## 2019-05-19 RX ADMIN — ASPIRIN 81 MG 81 MG: 81 TABLET ORAL at 09:21

## 2019-05-19 RX ADMIN — ENOXAPARIN SODIUM 40 MG: 40 INJECTION SUBCUTANEOUS at 09:22

## 2019-05-19 RX ADMIN — MYCOPHENOLATE MOFETIL 500 MG: 500 TABLET, FILM COATED ORAL at 21:20

## 2019-05-19 ASSESSMENT — PAIN SCALES - GENERAL
PAINLEVEL_OUTOF10: 5
PAINLEVEL_OUTOF10: 0
PAINLEVEL_OUTOF10: 5
PAINLEVEL_OUTOF10: 6

## 2019-05-19 NOTE — PLAN OF CARE
Problem: Falls - Risk of:  Goal: Will remain free from falls  Description  Will remain free from falls  5/19/2019 0125 by Sarah Camarillo RN  Outcome: Ongoing  Note:    Pt is a High fall risk. See Alondra Reasoner Fall Score and ABCDS Injury Risk assessments. High Fall Risk per MORALES/ABCDS: Explained fall risk precautions to pt and family and rationale behind their use to keep the patient safe. Pt bed is in low position, side rails up, call light and belongings are in reach. Fall wristband applied and present on pts wrist.  Bed alarm on. Pt encouraged to call for assistance. Will continue with hourly rounds for PO intake, pain needs, toileting and repositioning as needed.

## 2019-05-19 NOTE — PROGRESS NOTES
Assessment complete. Vitals stable. Patient up to bathroom with one assist and now in chair. On 4L nasal canula. Fall precautions in place. Will continue to monitor.      Vitals:    05/19/19 0904   BP: 117/64   Pulse: 85   Resp: 17   Temp: 98 °F (36.7 °C)   SpO2: (!) 89%

## 2019-05-20 LAB
A/G RATIO: 1.3 (ref 1.1–2.2)
ALBUMIN SERPL-MCNC: 3.3 G/DL (ref 3.4–5)
ALP BLD-CCNC: 96 U/L (ref 40–129)
ALT SERPL-CCNC: 7 U/L (ref 10–40)
ANION GAP SERPL CALCULATED.3IONS-SCNC: 8 MMOL/L (ref 3–16)
AST SERPL-CCNC: 16 U/L (ref 15–37)
BILIRUB SERPL-MCNC: 0.7 MG/DL (ref 0–1)
BUN BLDV-MCNC: 21 MG/DL (ref 7–20)
CALCIUM SERPL-MCNC: 9.2 MG/DL (ref 8.3–10.6)
CHLORIDE BLD-SCNC: 101 MMOL/L (ref 99–110)
CO2: 31 MMOL/L (ref 21–32)
CREAT SERPL-MCNC: 0.9 MG/DL (ref 0.6–1.2)
GFR AFRICAN AMERICAN: >60
GFR NON-AFRICAN AMERICAN: >60
GLOBULIN: 2.5 G/DL
GLUCOSE BLD-MCNC: 108 MG/DL (ref 70–99)
HCT VFR BLD CALC: 24.2 % (ref 36–48)
HEMOGLOBIN: 8.1 G/DL (ref 12–16)
MCH RBC QN AUTO: 33.3 PG (ref 26–34)
MCHC RBC AUTO-ENTMCNC: 33.7 G/DL (ref 31–36)
MCV RBC AUTO: 99 FL (ref 80–100)
PDW BLD-RTO: 16.2 % (ref 12.4–15.4)
PLATELET # BLD: 211 K/UL (ref 135–450)
PMV BLD AUTO: 6.7 FL (ref 5–10.5)
POTASSIUM SERPL-SCNC: 4.8 MMOL/L (ref 3.5–5.1)
RBC # BLD: 2.44 M/UL (ref 4–5.2)
SODIUM BLD-SCNC: 140 MMOL/L (ref 136–145)
TOTAL PROTEIN: 5.8 G/DL (ref 6.4–8.2)
WBC # BLD: 2.2 K/UL (ref 4–11)

## 2019-05-20 PROCEDURE — 80053 COMPREHEN METABOLIC PANEL: CPT

## 2019-05-20 PROCEDURE — 85027 COMPLETE CBC AUTOMATED: CPT

## 2019-05-20 PROCEDURE — 97116 GAIT TRAINING THERAPY: CPT | Performed by: PHYSICAL THERAPIST

## 2019-05-20 PROCEDURE — 97535 SELF CARE MNGMENT TRAINING: CPT

## 2019-05-20 PROCEDURE — 97530 THERAPEUTIC ACTIVITIES: CPT

## 2019-05-20 PROCEDURE — 97110 THERAPEUTIC EXERCISES: CPT

## 2019-05-20 PROCEDURE — 1280000000 HC REHAB R&B

## 2019-05-20 PROCEDURE — 6370000000 HC RX 637 (ALT 250 FOR IP): Performed by: PHYSICAL MEDICINE & REHABILITATION

## 2019-05-20 PROCEDURE — 36415 COLL VENOUS BLD VENIPUNCTURE: CPT

## 2019-05-20 PROCEDURE — 6360000002 HC RX W HCPCS: Performed by: PHYSICAL MEDICINE & REHABILITATION

## 2019-05-20 PROCEDURE — 97530 THERAPEUTIC ACTIVITIES: CPT | Performed by: PHYSICAL THERAPIST

## 2019-05-20 PROCEDURE — 97110 THERAPEUTIC EXERCISES: CPT | Performed by: PHYSICAL THERAPIST

## 2019-05-20 RX ADMIN — MYCOPHENOLATE MOFETIL 500 MG: 500 TABLET, FILM COATED ORAL at 20:07

## 2019-05-20 RX ADMIN — PROPRANOLOL HYDROCHLORIDE 10 MG: 20 TABLET ORAL at 20:06

## 2019-05-20 RX ADMIN — CHOLECALCIFEROL (VITAMIN D3) 10 MCG (400 UNIT) TABLET 1000 UNITS: at 10:22

## 2019-05-20 RX ADMIN — OXYCODONE HYDROCHLORIDE 5 MG: 5 TABLET ORAL at 06:53

## 2019-05-20 RX ADMIN — OXYCODONE HYDROCHLORIDE 5 MG: 5 TABLET ORAL at 13:26

## 2019-05-20 RX ADMIN — ENOXAPARIN SODIUM 40 MG: 40 INJECTION SUBCUTANEOUS at 10:24

## 2019-05-20 RX ADMIN — ASPIRIN 81 MG 81 MG: 81 TABLET ORAL at 10:23

## 2019-05-20 RX ADMIN — OXYCODONE HYDROCHLORIDE 5 MG: 5 TABLET ORAL at 20:06

## 2019-05-20 RX ADMIN — METHOCARBAMOL TABLETS 1000 MG: 500 TABLET, COATED ORAL at 06:51

## 2019-05-20 RX ADMIN — GABAPENTIN 300 MG: 300 CAPSULE ORAL at 20:06

## 2019-05-20 RX ADMIN — MYCOPHENOLATE MOFETIL 500 MG: 500 TABLET, FILM COATED ORAL at 10:25

## 2019-05-20 RX ADMIN — METHOCARBAMOL TABLETS 1000 MG: 500 TABLET, COATED ORAL at 13:26

## 2019-05-20 RX ADMIN — METHOCARBAMOL TABLETS 1000 MG: 500 TABLET, COATED ORAL at 20:06

## 2019-05-20 RX ADMIN — GABAPENTIN 300 MG: 300 CAPSULE ORAL at 13:26

## 2019-05-20 RX ADMIN — PRAVASTATIN SODIUM 10 MG: 10 TABLET ORAL at 20:08

## 2019-05-20 RX ADMIN — ACETAMINOPHEN 650 MG: 325 TABLET ORAL at 10:30

## 2019-05-20 RX ADMIN — PROPRANOLOL HYDROCHLORIDE 10 MG: 20 TABLET ORAL at 10:23

## 2019-05-20 RX ADMIN — TACROLIMUS 2 MG: 1 CAPSULE ORAL at 20:07

## 2019-05-20 RX ADMIN — CITALOPRAM HYDROBROMIDE 40 MG: 20 TABLET ORAL at 10:24

## 2019-05-20 RX ADMIN — TACROLIMUS 2 MG: 1 CAPSULE ORAL at 10:25

## 2019-05-20 RX ADMIN — GABAPENTIN 300 MG: 300 CAPSULE ORAL at 06:51

## 2019-05-20 ASSESSMENT — PAIN SCALES - GENERAL
PAINLEVEL_OUTOF10: 4
PAINLEVEL_OUTOF10: 6

## 2019-05-20 ASSESSMENT — PAIN DESCRIPTION - PROGRESSION
CLINICAL_PROGRESSION: NOT CHANGED

## 2019-05-20 ASSESSMENT — PAIN DESCRIPTION - ONSET
ONSET: ON-GOING

## 2019-05-20 ASSESSMENT — PAIN DESCRIPTION - PAIN TYPE
TYPE: ACUTE PAIN

## 2019-05-20 ASSESSMENT — PAIN DESCRIPTION - LOCATION
LOCATION: RIB CAGE

## 2019-05-20 ASSESSMENT — PAIN DESCRIPTION - ORIENTATION
ORIENTATION: LEFT

## 2019-05-20 ASSESSMENT — PAIN DESCRIPTION - DESCRIPTORS
DESCRIPTORS: SHARP
DESCRIPTORS: SHARP;STABBING
DESCRIPTORS: SHARP;ACHING

## 2019-05-20 ASSESSMENT — PAIN - FUNCTIONAL ASSESSMENT
PAIN_FUNCTIONAL_ASSESSMENT: PREVENTS OR INTERFERES SOME ACTIVE ACTIVITIES AND ADLS

## 2019-05-20 ASSESSMENT — PAIN DESCRIPTION - FREQUENCY
FREQUENCY: CONTINUOUS
FREQUENCY: INTERMITTENT
FREQUENCY: INTERMITTENT

## 2019-05-20 NOTE — PROGRESS NOTES
transfusion, Hyperlipidemia, Hypertension, IBS (irritable bowel syndrome), Liver disease, NSTEMI (non-ST elevated myocardial infarction) ([facility] [address] Pneumonia, Rectal varices, and Unspecified diseases of blood and blood-forming organs. has a past surgical history that includes Cholecystectomy ();  section (); Upper gastrointestinal endoscopy (11); Colonoscopy; Colonoscopy; Upper gastrointestinal endoscopy (); Upper gastrointestinal endoscopy (2011); Upper gastrointestinal endoscopy (11); Upper gastrointestinal endoscopy (11); Endoscopy, colon, diagnostic; Upper gastrointestinal endoscopy (10-6-14); Colonoscopy (11/10/14); Upper gastrointestinal endoscopy (11/18/15); and fracture surgery. Restrictions  Restrictions/Precautions  Required Braces or Orthoses?: Yes  Required Braces or Orthoses  Spinal: Thoracic Lumbar Sacral Orthotics  Position Activity Restriction  Other position/activity restrictions: TLSO OOB. Pt may shower w/o TLSO. No lifting more than 10lbs. No bending, twisting, pushing or pulling. Subjective   General  Chart Reviewed: Yes  Patient assessed for rehabilitation services?: Yes  Additional Pertinent Hx: Pt is a 78 yo female who presents to [place] [address]  from [address] [place] [address] s/p fall down concrete steps resulting in L anterolateral 3rd-9th rib fractures, T3 wedge fracture, and posterior 11th rib fracture. Pt's hospital stay was complicated by respiratory distress now requiring 4L O2. Pt has PMHx including: DM, GERD, high cholesterol, hypertension, IBS, hemochromatosis, liver transplant. Pt admitted to ARU   Response to previous treatment: Patient with no complaints from previous session  Family / Caregiver Present: No  Referring Practitioner: Dr Ger Acosta   Diagnosis: L anterolateral 3rd-9th rib fractures, T3 wedge fracture, and posterior 11th rib fracture. Up ad louisa in room w/ RW   Subjective  Subjective: Pt was supine in bed upon arrival and agreeable to OT.    General Comment  Comments: Mariya Smith Vital Signs  Patient Currently in Pain: Yes(Pt c/o intermittent pain in rib cage but denied needing meds)   Orientation     Objective    ADL  Grooming: Independent(Stance at sink to wash hands and brush teeth)  UE Dressing: Setup(Seated to don TLSO. Min VCs needed for proper fit )  Toileting: Modified independent (Independent w/ pants management and darcie care. Use of RW for balance during pants management. Increased time for toileting needed )  Additional Comments: Improved management of O2 cords noticed today and improved RW management. Balance  Standing Balance: Contact guard assistance(CGA progressing to SBA)  Standing Balance  Time: ~15 mins total  Activity: functional mobility to/from bathroom, functional mobility to/from gym, stance for ADLs, dynamic balance task   Comment: Dynamic balance task completed while pt bounced small ball w/ BUEs x 20 reps + L to R x 20 reps. Increased difficulty w/ L to R. Pt completed primarily w/ CGA w/ one LOB requiring Min A. Functional Mobility  Functional - Mobility Device: Rolling Walker  Activity: To/from bathroom; Other(to/from gym)  Assist Level: Contact guard assistance(CGA progressing to SBA')  Functional Mobility Comments: Pt requested to ambulate w/o RW on way back to gym. Pt completed w/ CGA and no LOB. 4L O2 throughout session  Toilet Transfers  Toilet - Technique: Ambulating  Equipment Used: Standard toilet  Toilet Transfer: Modified independent    Bed mobility  Supine to Sit: Supervision(HOB elevated)  Transfers  Sit to stand: Stand by assistance  Stand to sit: Stand by assistance                       Cognition  Overall Cognitive Status: [facility]                                         2nd session: Pt was seated in recliner chair upon arrival and agreeable to OT. Pt on 4L O2. Pt ambulated w/ RW and spvn to dining room.  Pt engaged in item retrieval task while ambulating w/o RW to retrieve clothes, fold clothes in stance, and return clothes to cabinet. Pt completed w/ SBA and no LOB. Pt demos good ability to reach lower cabinet w/o LOB. Assist needed from OT to manage O2 tank while ambulating. Following folding task pt ambulated to kitchen cabinets and engaged in dynamic reaching task while retrieving various pots, pans, and utensils from cabinets. Pt was able to reach to low cabinets w/ SBA w/ unilateral support on counter. Pt maintained stance for 15 mins total w/o rest break needed. Pt ambulated w/ RW and SBA to therapy gym. Seated UE exercises completed to increase UE strength and activity tolerance. Pt only able to complete elbow flexion w/ 1lb weight w/ LUE. Unable to complete additional exercises w/ LUE due to rib pain w/ movement. Pt completed elbow flexion, forward punches, and overhead press x 15 reps w/ 2lb weight RUE. SpO2 at 95-97% when assessed. Pt ambulated w/ RW and SBA back to room. Pt left seated in recliner chair w/ chair alarm on and call light within reach.            Plan    Plan  Times per week: 5x a week for 90 mins daily   Times per day: Daily  Current Treatment Recommendations: Strengthening, Endurance Training, Patient/Caregiver Education & Training, Self-Care / ADL, Balance Training, Home Management Training, Safety Education & Training  G-Code     OutComes Score                                                  AM-PAC Score             Goals  Short term goals  Time Frame for Short term goals: STG=LTG   Long term goals  Time Frame for Long term goals : 7 days  Long term goal 1: Pt will be independent w/ LE dressing-ongoing   Long term goal 2: Pt will be independent w/ UE dressing including donning TLSO brace-ongoing   Long term goal 3: Pt will tolerate stance for 10 mins MOD I to complete meal prep task -ongoing   Long term goal 4: Pt will be MOD I w/ tub transfer -ongoing   Long term goal 5: Pt will complete bathing tasks MOD I - goal met UB 5/18, ongoing for LE bathing   Patient Goals   Patient goals : Yesy Burleson out of here in a week\" \"get my balance\"        Therapy Time   Individual Concurrent Group Co-treatment   Time In 0730         Time Out 0830         Minutes 60                Second Session Therapy Time:   Individual Concurrent Group Co-treatment   Time In  1130         Time Out  1200         Minutes  30           Timed Code Treatment Minutes:  30    Total Treatment Minutes:  60+30= 90     Jo Vanegas, OT

## 2019-05-20 NOTE — PLAN OF CARE
Problem: Pain:  Goal: Control of acute pain  Description  Control of acute pain  Outcome: Ongoing  Note:   Patient has been resting quietly since medicated for pain. Respirations unlabored at rest. Oxygen in use at 4 L per nasal cannula. Will continue to assess for pain mainagement. Problem: Falls - Risk of:  Goal: Absence of physical injury  Description  Absence of physical injury  Note:   Patient has called appropriately for assistance prior to getting out of bed. Fall precautions in place. Bed alarm set. Bed in low and locked position. Call light in reach. She has remained free from injury.

## 2019-05-20 NOTE — PATIENT CARE CONFERENCE
The [address] [place] [address] Rehabilitation  Weekly Team Conference Note    Patient Name: Nat Bajwa        MRN: [id number]    : 1950  (79 y.o.)  Gender: female       The team conference for this patient was held on 2019 at 9:00am by:  Ezekiel Cushing. Semaj Burgos MD.    PHYSICAL THERAPY:  Bed Mobility: Scooting: Independent    Transfers:  Sit to Stand: Modified independent(With use of the RW)  Stand to sit: Modified independent  Bed to Chair: Contact guard assistance(Able to take a few steps )  Stand Pivot Transfers: Contact guard assistance  Comment: Pt req use of the rerstroom. Pt was able to lower pants/briefs indly. Pt demo Ind with pericare. Pt was able to pull pants/briefs up indly. Pt used the RW to walk to the sink and wash hands w/o difficulty    WB Status: No WB restrictions noted in the chart  Ambulation 1  Surface: level tile  Device: Rolling Walker  Other Apparatus: O2(4 L)  Assistance: Contact guard assistance  Quality of Gait: Narrow ACACIA, min flexed trunk,   Distance: 100'x2, short distances in the room  Comments: O2 sats measured at 90% following gait on 4 L O2 and increased to >90% within a 2 minute rest.  SOB was not observed. PT also performed the TUG test: times include: 13.74, 13.42, 12. 83 for an average of 13.33 indicating pt is a low fall risk.      Stairs  # Steps : 6  Stairs Height: 6\"  Rails: Bilateral  Device: No Device  Assistance: Supervision  Comment: Non reciprocal pattern    FIMS:  Bed, Chair, Wheel Chair: 6 - Requires assistive device (slide rail)  Walk: 5 - Supervision Requires standby supervision or cuing to walk at least 150 feet  Distance Walked: 350'  Wheel Chair: 5 - Supervision Requires standby supervision or cuing to operate wheelchair at least 150 feet  Distance Traveled in [address]: 150'  Stairs: 2- [address] 25-49% of the effort to go up and down 4 to 6 stairs and requires the assistance of one person only    PT Equipment tolerance     Rehab Team Members in attendance for Team Conference:  :  Nitish Christensen    Nurse Manager:  Jane Shaw, MSN, RN    Therapy Manager:  Jess Martinez, PT, DPT    Social Work:  ASHWIN Kaiser, [place]    Therapy:  Darius Stephen, PT  Sallie Bo, OTR/L    Nutrition:  Colette Carroll, RD LD    I approve the established interdisciplinary plan of care as documented within the medical record of Jorge Padilla. MD [facility] IRINA Rivera MD 5/22/2019, 10:56 AM

## 2019-05-20 NOTE — PLAN OF CARE
Pt educated to use her call light when she needs assistance. Pt up ab louisa, non skid socks on, and call light placed within reach. RN will continue to monitor Pt.

## 2019-05-20 NOTE — PROGRESS NOTES
Isabella Nette  5/20/2019  [id number]    Chief Complaint: Multiple trauma    Subjective:   No overnight events. Has intermittent cough, sometimes productive. ROS: No CP, SOB, dyspnea    Objective:  Patient Vitals for the past 24 hrs:   BP Temp Temp src Pulse Resp SpO2   05/19/19 1954 119/75 98.1 °F (36.7 °C) Oral -- 16 94 %   05/19/19 0904 117/64 98 °F (36.7 °C) Oral 85 17 (!) 89 %     Gen: No distress, pleasant. Resting in bed  HEENT: Normocephalic, atraumatic. CV: Regular rate and rhythm. No MRG  Resp: No respiratory distress. Coarse BS L side  Abd: Soft, nontender nondistended   Ext: No edema. Neuro: Alert, oriented, appropriately interactive. Laboratory data: Available via EMR. Therapy progress:  PT  Required Braces or Orthoses  Spinal: Thoracic Lumbar Sacral Orthotics  Position Activity Restriction  Other position/activity restrictions: TLSO OOB. Pt may shower w/o TLSO. No lifting more than 10lbs. No bending, twisting, pushing or pulling. Objective     Sit to Stand: Contact guard assistance  Stand to sit: Stand by assistance(Cues for safe transfer and correct hand placement)  Bed to Chair: Contact guard assistance(Able to take a few steps )  Stand Pivot Transfers: Contact guard assistance  Device: Rolling Walker  Other Apparatus: O2(4 L)  Assistance: Contact guard assistance  Distance: 360 feet and 105 feet  OT  PT Equipment Recommendations  Other: Ongoing assessment at this time  Toilet - Technique: Ambulating  Equipment Used: Standard toilet  Toilet Transfers Comments: VCs for safe descent due to low height of toilet   Assessment        SLP                Body mass index is 20.82 kg/m².     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 TLSO for T3 compression fx. PT/OT     OLT: cellcept and prograf levels appropriate at . CMP appropriate on admission.     Neuropathy: gabapentin     HTN: inderal       Depression: celexa     CAD: ASA    Respiratory failure: Remains on O2 supplementation, complicated by rib fractures, stress IS, Duoneb PRN;      Bowels: Per protocol  Bladder: Per protocol   Sleep: Trazodone provided prn. Pain: Robaxin scheduled, crow PRN  DVT PPx: Lovenox     Vincent Burgos MD 5/20/2019, 8:57 AM

## 2019-05-20 NOTE — FLOWSHEET NOTE
05/20/19 1126   Encounter Summary   Services provided to: Patient   Volunteer Visit   (5/17 deepa/federica dee)   Sacraments   Communion Patient received communion

## 2019-05-20 NOTE — PROGRESS NOTES
Physical Therapy  Facility/Department: [facility] ACUTE REHAB UNIT  Daily Treatment Note  NAME: Jessie Kennedy  : 1950  MRN: [id number]    Date of Service: 2019    Discharge Recommendations:  Home independently, Home with Home health PT   PT Equipment Recommendations  Other: Ongoing assessment at this time    Patient Diagnosis(es): There were no encounter diagnoses. 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 has a past surgical history that includes Cholecystectomy ();  section (); Upper gastrointestinal endoscopy (11); Colonoscopy; Colonoscopy; Upper gastrointestinal endoscopy (); Upper gastrointestinal endoscopy (2011); Upper gastrointestinal endoscopy (11); Upper gastrointestinal endoscopy (11); Endoscopy, colon, diagnostic; Upper gastrointestinal endoscopy (10-6-14); Colonoscopy (11/10/14); Upper gastrointestinal endoscopy (11/18/15); and fracture surgery. Restrictions  Restrictions/Precautions  Required Braces or Orthoses?: Yes  Required Braces or Orthoses  Spinal: Thoracic Lumbar Sacral Orthotics  Position Activity Restriction  Other position/activity restrictions: TLSO OOB. Pt may shower w/o TLSO. No lifting more than 10lbs. No bending, twisting, pushing or pulling. Subjective   General  Chart Reviewed:  Yes  Additional Pertinent Hx: Pt is 78 yo F admitted to [facility] ARU on 19 from acute stay at [facility] for multiple trauma following fall on stairs. History of OLT secondary to cirrhosis due to hemachromatosis, COPD, DM, CAD, HTN, HLD who was admitted to [facility] on 4/30 after a fall. Imaging revealed left anterior 3-9 and left posterior 11th rib fractures. She required placement of chest tube which was performed 5/6 and subsequently removed on 5/14. She was also noted to have an anterior T3 wedge fracture and was suggested to utilize a TLSO when out of bed. Subjective  Subjective: Pt  sitting up in  BS chair when PT arrived. Pt  agreeable to therapy. General Comment  Comments: Pt \"inquired about being up in the bathroom without having to use the alarms. \"  Pain Screening  Patient Currently in Pain: Yes  Pain Assessment  Patient's Stated Pain Goal: No pain  Pain Type: Acute pain  Pain Location: Rib cage  Pain Orientation: Left  Pain Descriptors: Guinevere Grebe; Stabbing  Pain Frequency: Intermittent(Increases when coughing)  Pain Onset: On-going  Clinical Progression: Not changed  Functional Pain Assessment: Prevents or interferes some active activities and ADLs  Non-Pharmaceutical Pain Intervention(s): Emotional support;Repositioned; Therapeutic presence; Ambulation/Increased Activity  Response to Pain Intervention: Patient Satisfied  Vital Signs  Patient Currently in Pain: Yes       Orientation  Orientation  Overall Orientation Status: Within Normal Limits  Cognition      Objective      Transfers  Sit to Stand: Modified independent(With use of the RW)  Stand to sit: Modified independent  Ambulation  Ambulation?: Yes  WB Status: No WB restrictions noted in the chart  Ambulation 1  Surface: level tile  Device: Rolling Walker  Other Apparatus: O2(4 L)  Quality of Gait: Narrow ACACIA, min flexed trunk,   Distance: 100'x2, short distances in the room  Comments: O2 sats measured at 90% following gait on 4 L O2 and increased to >90% within a 2 minute rest.  SOB was not observed.  PT also performed the TUG test: times include: 13.74, 13.42, 12. 83 for an average of 13.33 indicating pt is a low fall risk. Comment: Pt req use of the restroom. Pt was able to lower pants/briefs indly. Pt demo Ind with pericare. Pt was able to pull pants/briefs up indly. Pt used the RW to walk to the sink and wash hands w/o difficulty      Second session: Pt sitting in BS Chair when PT arrived. Pt agreeable to therapy. Transfers  Sit to Stand: Modified independent(With use of the RW)  Stand to sit: Modified independent  Ambulation 1  Surface: level tile  Device: Rolling Walker  Other Apparatus: O2(4 L)  Quality of Gait: Narrow ACACIA, min flexed trunk,   Assistance: Req SBA/ CGA for distances>200' as pt starts to appear unsteady  Distance: 350' x2( this includes amb up and down a ramp x 75' in each direction. Stairs/Curb  Stairs?: Yes  Stairs  # Steps : 6  Stairs Height: 6\"  Rails: Bilateral  Device: No Device  Assistance: Supervision  Comment: Non reciprocal pattern  PT instructed pt with  The following standing ex using  BUE support:  1. Toe raises/heel raises  2. Marching   3. Hip abd( alternating)  4. Hamstring curls ( alternating)   5. Mini squats  6. Hip flex with knee ext  Munir 10 reps of each with brief rests between    Pt returned to room and positioned in BS chair. Pt is now up ad louisa in the room with the use of the RW. Assessment   Body structures, Functions, Activity limitations: Decreased functional mobility ; Decreased endurance;Decreased balance; Increased Pain  Assessment: Following the TUG results, PT collaborated with the other disciplines regarding pt being up ad louisa in the room with the use of the RW. All in agreement. PT reviewed amb in the room with negotiating the O2 tubing. Pt demonstrated ind with navigating the RW with the O2 tubing. Pt is well below baseline and would benefit from continued therapy to maximize potential and increase functional mobility towards Ind to allow for a safer return home.     Treatment Diagnosis: Multiple trauma resulting in decreased

## 2019-05-21 PROCEDURE — 6370000000 HC RX 637 (ALT 250 FOR IP): Performed by: PHYSICAL MEDICINE & REHABILITATION

## 2019-05-21 PROCEDURE — 97116 GAIT TRAINING THERAPY: CPT | Performed by: PHYSICAL THERAPIST

## 2019-05-21 PROCEDURE — 6360000002 HC RX W HCPCS: Performed by: PHYSICAL MEDICINE & REHABILITATION

## 2019-05-21 PROCEDURE — 97530 THERAPEUTIC ACTIVITIES: CPT

## 2019-05-21 PROCEDURE — 97110 THERAPEUTIC EXERCISES: CPT | Performed by: PHYSICAL THERAPIST

## 2019-05-21 PROCEDURE — 1280000000 HC REHAB R&B

## 2019-05-21 PROCEDURE — 97535 SELF CARE MNGMENT TRAINING: CPT

## 2019-05-21 RX ADMIN — METHOCARBAMOL TABLETS 1000 MG: 500 TABLET, COATED ORAL at 10:42

## 2019-05-21 RX ADMIN — ENOXAPARIN SODIUM 40 MG: 40 INJECTION SUBCUTANEOUS at 10:44

## 2019-05-21 RX ADMIN — TACROLIMUS 2 MG: 1 CAPSULE ORAL at 20:16

## 2019-05-21 RX ADMIN — CHOLECALCIFEROL (VITAMIN D3) 10 MCG (400 UNIT) TABLET 1000 UNITS: at 10:43

## 2019-05-21 RX ADMIN — OXYCODONE HYDROCHLORIDE 5 MG: 5 TABLET ORAL at 20:16

## 2019-05-21 RX ADMIN — OXYCODONE HYDROCHLORIDE 5 MG: 5 TABLET ORAL at 07:09

## 2019-05-21 RX ADMIN — MYCOPHENOLATE MOFETIL 500 MG: 500 TABLET, FILM COATED ORAL at 20:16

## 2019-05-21 RX ADMIN — PROPRANOLOL HYDROCHLORIDE 10 MG: 20 TABLET ORAL at 20:16

## 2019-05-21 RX ADMIN — METHOCARBAMOL TABLETS 1000 MG: 500 TABLET, COATED ORAL at 20:15

## 2019-05-21 RX ADMIN — GABAPENTIN 300 MG: 300 CAPSULE ORAL at 15:08

## 2019-05-21 RX ADMIN — OXYCODONE HYDROCHLORIDE 5 MG: 5 TABLET ORAL at 13:39

## 2019-05-21 RX ADMIN — GABAPENTIN 300 MG: 300 CAPSULE ORAL at 20:15

## 2019-05-21 RX ADMIN — CITALOPRAM HYDROBROMIDE 40 MG: 20 TABLET ORAL at 10:42

## 2019-05-21 RX ADMIN — METHOCARBAMOL TABLETS 1000 MG: 500 TABLET, COATED ORAL at 15:08

## 2019-05-21 RX ADMIN — ASPIRIN 81 MG 81 MG: 81 TABLET ORAL at 10:42

## 2019-05-21 RX ADMIN — PRAVASTATIN SODIUM 10 MG: 10 TABLET ORAL at 20:16

## 2019-05-21 RX ADMIN — MYCOPHENOLATE MOFETIL 500 MG: 500 TABLET, FILM COATED ORAL at 10:45

## 2019-05-21 RX ADMIN — GABAPENTIN 300 MG: 300 CAPSULE ORAL at 10:44

## 2019-05-21 RX ADMIN — TACROLIMUS 2 MG: 1 CAPSULE ORAL at 10:45

## 2019-05-21 ASSESSMENT — PAIN DESCRIPTION - FREQUENCY
FREQUENCY: INTERMITTENT
FREQUENCY: INTERMITTENT

## 2019-05-21 ASSESSMENT — PAIN DESCRIPTION - PROGRESSION
CLINICAL_PROGRESSION: NOT CHANGED

## 2019-05-21 ASSESSMENT — PAIN DESCRIPTION - PAIN TYPE
TYPE: ACUTE PAIN
TYPE: ACUTE PAIN

## 2019-05-21 ASSESSMENT — PAIN DESCRIPTION - ONSET
ONSET: ON-GOING
ONSET: ON-GOING

## 2019-05-21 ASSESSMENT — PAIN SCALES - GENERAL
PAINLEVEL_OUTOF10: 5
PAINLEVEL_OUTOF10: 6
PAINLEVEL_OUTOF10: 4
PAINLEVEL_OUTOF10: 6
PAINLEVEL_OUTOF10: 5
PAINLEVEL_OUTOF10: 5

## 2019-05-21 ASSESSMENT — PAIN DESCRIPTION - ORIENTATION
ORIENTATION: LEFT
ORIENTATION: LEFT

## 2019-05-21 ASSESSMENT — PAIN DESCRIPTION - LOCATION
LOCATION: RIB CAGE
LOCATION: RIB CAGE

## 2019-05-21 ASSESSMENT — PAIN DESCRIPTION - DESCRIPTORS
DESCRIPTORS: SHARP
DESCRIPTORS: ACHING;SHARP

## 2019-05-21 ASSESSMENT — PAIN - FUNCTIONAL ASSESSMENT: PAIN_FUNCTIONAL_ASSESSMENT: PREVENTS OR INTERFERES SOME ACTIVE ACTIVITIES AND ADLS

## 2019-05-21 NOTE — PLAN OF CARE
Pt educated to use her call light when she needs assistance. Pt up ad louisa in room. Non skid socks on and call light placed within reach. RN will continue to monitor Pt.

## 2019-05-21 NOTE — PROGRESS NOTES
Physical Therapy  Facility/Department: [facility] ACUTE REHAB UNIT  Daily Treatment Note  NAME: Kannan Ramachandran  : 1950  MRN: [id number]    Date of Service: 2019    Discharge Recommendations:  Home independently, Home with Home health PT   PT Equipment Recommendations  Other: Ongoing assessment at this time    Patient Diagnosis(es): There were no encounter diagnoses. 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, COPD (chronic obstructive pulmonary disease) ([facility] [address] Diabetes mellitus ([facility] [address] [facility] disease [facility] Esophageal varices with bleeding(456.0), GERD (gastroesophageal reflux disease), GERD (gastroesophageal reflux disease), History of blood transfusion, Hyperlipidemia, Hypertension, IBS (irritable bowel syndrome), Liver disease, NSTEMI (non-ST elevated myocardial infarction) ([facility] [address] Pneumonia, Rectal varices, and Unspecified diseases of blood and blood-forming organs. has a past surgical history that includes Cholecystectomy ();  section (); Upper gastrointestinal endoscopy (11); Colonoscopy; Colonoscopy; Upper gastrointestinal endoscopy (); Upper gastrointestinal endoscopy (2011); Upper gastrointestinal endoscopy (11); Upper gastrointestinal endoscopy (11); Endoscopy, colon, diagnostic; Upper gastrointestinal endoscopy (10-6-14); Colonoscopy (11/10/14); Upper gastrointestinal endoscopy (11/18/15); and fracture surgery. Restrictions  Restrictions/Precautions  Required Braces or Orthoses?: Yes  Required Braces or Orthoses  Spinal: Thoracic Lumbar Sacral Orthotics  Position Activity Restriction  Other position/activity restrictions: TLSO OOB. Pt may shower w/o TLSO. No lifting more than 10lbs. No bending, twisting, pushing or pulling. Up ad louisa in room only  with RW as of 2019  Subjective   General  Chart Reviewed:  Yes  Additional Pertinent Hx: Pt is 80 yo F admitted to [facility] ARU on 19 from acute stay at [facility] for multiple trauma following fall on stairs. History of OLT secondary to cirrhosis due to hemachromatosis, COPD, DM, CAD, HTN, HLD who was admitted to [facility] on  after a fall. Imaging revealed left anterior 3-9 and left posterior 11th rib fractures. She required placement of chest tube which was performed  and subsequently removed on . She was also noted to have an anterior T3 wedge fracture and was suggested to utilize a TLSO when out of bed. Family / Caregiver Present: No  General Comment  Comments: Pt  sitting up in  BS chair when PT arrived. Pt  agreeable to therapy. Pain Screening  Patient Currently in Pain: Denies  Vital Signs  Patient Currently in Pain: Denies       Orientation  Orientation  Overall Orientation Status: Within Normal Limits  Cognition      Objective      Transfers  Sit to Stand: Modified independent(With use of the RW( from bedside chair and armed chair,)  Stand to sit: Modified independent  Ambulation  Ambulation?: Yes  WB Status: No WB restrictions noted in the chart  Ambulation 1  Surface: level tile  Other Apparatus: O2(PT instructed pt with transporting the O2 tank)  Assistance: Stand by assistance  Quality of Gait: Narrow ACACIA, min flexed trunk,   Distance: 300' x 1, 200' x2,   Comments: O2 sats measured at 85% following gait on 4 L O2 and increased to >90% within a 1 minute rest.  SOB was observed. Stairs/Curb  Stairs?: Yes  Stairs  # Steps : 6  Stairs Height: 6\"  Rails: Bilateral  Device: No Device  Assistance: Supervision  Comment: Non reciprocal pattern    PT emphasized the importance of good posture and the benefits of obtaining/maintaining good posture. PT instructed pt with the wall ex in standing using the wall as a point of reference for bony alignment. Pt instructed with UE AROM consisting of shld flex and shld abd. Pt also instructed with the following postural ex in sittin. Shoulder retractions  2. Shoulder depressions   3.  Anterior pelvic tilts  4. Cervical retractions   Munir 10 reps of each ex      Second session: Pt sitting in BS chair when PT arrived. Pt agreeable to therapy. Transfers  Sit to Stand: Modified independent(With use of the RW( from bedside chair and armed chair,)  Stand to sit: Modified independenart  Ambulation 1  Surface: level tile  Other Apparatus: O2(PT instructed pt with transporting the O2 tank)  Assistance: Stand by assistance  Quality of Gait: Narrow ACACIA, min flexed trunk,   Distance:215' , 200'     Pt instructed with using the bottom step of a flight of steps and performing the following: alternating toe tapping in a forwards direction x 10 reps, sideways( hip abd and adduction)  direction x 10 reps  and backwards direction x 10 reps to BLES , 4 step ex alternating using the bottom 2 steps x10 reps, and alternating heel tapping  X 10 reps. Pt req rests between. No handrail used    O2 sats  Monitored following gait training. 86% and 121 Pulse. PT continues with diaphragmatic breathing ex instruction    Pt returned to room and positioned in BS chair. Call light and phone placed within reach. Chair alarm reactivated         Assessment   Body structures, Functions, Activity limitations: Decreased functional mobility ; Decreased endurance;Decreased balance; Increased Pain  Assessment: Pt is still very limited by pain in the ribs with coughing, SOB and decreased overall endurance. Pt is well below baseline and would benefit from continued therapy to maximize potential and increase functional mobility towards Ind to allow for a safer return home. Treatment Diagnosis: Multiple trauma resulting in decreased functional mobility  Patient Education:  transf training, gt training, bed mobility skills, stair training  REQUIRES PT FOLLOW UP: Yes  Activity Tolerance  Activity Tolerance: Patient limited by endurance; Patient limited by fatigue     G-Code     OutComes Score                                                  AM-PAC Score Goals  Short term goals  Time Frame for Short term goals: 7 days   Short term goal 1: Ind bed mobility  ONGOING  Short term goal 2: Ind with transf excluding floor transf Goal achieved  Short term goal 3: Ind with amb with LRAD >300' at a time  ONGOING  Short term goal 4: Amb up and down 6 steps with MI (note, pt reports that she does not \"do\" steps other than to get into home.  The 6 step goal is a strengthening goal also)  ONGOING  Long term goals  Time Frame for Long term goals : STG=LTG  Patient Goals   Patient goals : Decrease pain, Home w/o O2     Plan    Plan  Times per week: 5-7x week x 90 minutes  Times per day: Daily  Current Treatment Recommendations: Endurance Training, Wheelchair Mobility Training, Transfer Training, Functional Mobility Training, Safety Education & Training, Positioning, Balance Training, Pain Management, Stair training, Gait Training, Strengthening, Patient/Caregiver Education & Training  Safety Devices  Type of devices: Call light within reach, Chair alarm in place, Left in chair, Nurse notified  Restraints  Initially in place: No     Therapy Time   Individual Concurrent Group Co-treatment   Time In 0930         Time Out 1030         Minutes 60         Timed Code Treatment Minutes: 15[address] S Time:   Individual Concurrent Group Co-treatment   Time In 1315         Time Out 1355         Minutes 40           Timed Code Treatment Minutes:  60+40    Total Treatment Minutes:  2907 Zia Ford, PT

## 2019-05-21 NOTE — PROGRESS NOTES
Occupational Therapy  Facility/Department: [facility] ACUTE REHAB UNIT  Daily Treatment Note  NAME: Josee Sheehan  : 1950  MRN: [id number]    Date of Service: 2019    Discharge Recommendations:  Home with assist PRN, Continue to assess pending progress(would benefit from cardiopulmonary therapy)  OT Equipment Recommendations  Equipment Needed: Yes  Mobility Devices: ADL Assistive Devices  ADL Assistive Devices: Reacher(Pt plans to purchase reacher when she gets home)  Other: Pt has a shower chair. Assessment   Performance deficits / Impairments: Decreased functional mobility ; Decreased safe awareness;Decreased balance;Decreased ADL status; Decreased high-level IADLs  Assessment: Pt demos good participation in OT this date and completed LE bathing w/ spvn, UE dressing w/ min VCs for proper donning of TLSO, and LE dressing w/ spvn. Pt's biggest barrier is SOB and decreased activity tolerance. Pt continues to benefit from OT. Cont OT per POC   Treatment Diagnosis: Decreased ADL status 2/2 T3 wedge fracture, anterolateral 3rd-9th rib fractures, posterior 11th rib fracture   Patient Education: rest breaks, PLB- pt verb understanding   REQUIRES OT FOLLOW UP: Yes  Activity Tolerance  Activity Tolerance: Patient Tolerated treatment well;Patient limited by fatigue  Activity Tolerance: Mild SOB during and after shower. VCs needed for rest breaks   Safety Devices  Safety Devices in place: Yes  Type of devices: Left in chair;Nurse notified(pt is up ad louisa in room)         Patient Diagnosis(es): There were no encounter diagnoses.       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, COPD (chronic obstructive pulmonary disease) ([facility] [address] Diabetes mellitus ([facility] [address] [facility] End stage liver disease ([facility] [address] Esophageal varices with bleeding(456.0), GERD (gastroesophageal reflux disease), GERD (gastroesophageal reflux disease), History of blood transfusion, Hyperlipidemia, Hypertension, IBS (irritable Comment  Comments: . Orientation  Orientation  Overall Orientation Status: Within Normal Limits  Objective    ADL  Equipment Provided: Long-handled sponge  Feeding: Modified independent (dentures present)  Grooming: Independent(Stance at sink to complete oral care. Seated in recliner chair to comb hair )  UE Bathing: Modified independent (Seated on TTB to complete)  LE Bathing: Supervision(Seated on TTB to complete majority of task. Brief time in stance to wash/dry buttocks. Use of LH sponge to wash back and BLEs)  UE Dressing: Independent(Pt retrieved clothes from suitcase and transported w/ BUEs w/o RW to bathroom. Pt I'ly donned tshirt )  LE Dressing: Supervision; Increased time to complete(Pt transported clothing to bathroom. Seated to thread BLEs into underwear and pants. Figure 4 to don socks. Stance w/ spvn to pull pants over hips. Increased time needed due to SOB. )  Toileting: Modified independent (Ind w/ clothing management and darcie care. Use of grab bar for balance. )  Additional Comments: Pt completed ADLs w/ above listed levels of assistance needed. Pt demos some minor impulsivity such as forgetting to don  socks and not taking rest breaks when SOB. Pt's biggest barrier at this time is related to SOB and pt requires VCs to take her time and pace herself. Pt requires some increased time for ADLs in order to take rest breaks following bathing and dressing tasks. Instrumental ADL's  Instrumental ADLs: Yes  Light Housekeeping  Light Housekeeping Level: Other(no device)  Light Housekeeping Level of Assistance: Supervision  Light Housekeeping: Pt ambulated w/o device w/ dirty laundy bag in BUEs w/ spvn. Pt loaded washing machine I'ly but required assistance to retrieve laundry detergent from floor level. OT educated pt on placing laundry items in more accessible location at home to prevent bending and to use reacher when necessary. Pt demos good balance during completion of task and w/ no LOB. Balance  Sitting Balance: Independent  Standing Balance: Supervision  Standing Balance  Time: ~10 mins total  Activity: functional mobility to/from bathroom, functional mobility to/from dining room, stance for ADLs   Functional Mobility  Functional - Mobility Device: Rolling Walker  Activity: To/from bathroom; Other(to/from dining room)  Assist Level: Supervision  Functional Mobility Comments: Spvn w/ use of RW, SBA w/o use of RW to dining room   Toilet Transfers  Toilet - Technique: Ambulating  Equipment Used: Standard toilet  Toilet Transfer: Modified independent  Shower Transfers  Shower - Transfer From: Paige Goldberg - Transfer Type: To and From  Shower - Transfer To: Transfer tub bench  Shower - Technique: Ambulating  Shower Transfers: Supervision      Bed mobility  Supine to Sit: Supervision(HOB elevated)  Transfers  Sit to stand: Modified independent  Stand to sit: Modified independent                       Cognition  Overall Cognitive Status: WFL  Cognition Comment: Pt is mildly impulsive at times                                          2nd session: Pt was supine in bed w/ HOB elevated upon arrival. Pt reported having increased pain in L ribs but agreeable to OT. OT spoke w/ RN who stated that pt was not due for pain meds yet. Pt ambulated w/ RW and spvn to dining room. Pt switched laundry from washing machine to dryer w/ spvn. VCs needed to prevent bending from back and bend from knees instead. Pt required assist w/ removing clothes that were further back in the washing machine as she could not safely reach. Pt adjusted dryer settings I'ly and required rest break following task due to increased pain. SpO2 91% while seated. VCs needed for PLB. Pt ambulated w/ RW and spvn to therapy gym. Tub transfer practice completed as pt has a tub unit at home. Pt completed dry tub transfer w/ spvn and use of grab bars.  OT educated on safe bathroom techniques such as use of non skid mat, turning water on once seated on shower chair, and keeping supplies/towels nearby. Pt verb understanding. Pt then completed item retrieval task w/o device while using reacher to retrieve bean bags from the ground. Pt unable to bend to ground at this time due to restrictions. Pt completed w/ spvn and no LOB. Pt ambulated w/o device and SBA back to room. Pt left seated in recliner chair w/ call light within reach and lunch tray delivered. Pt is up ad louisa in the room.                         Plan   Plan  Times per week: 5x a week for 90 mins daily   Times per day: Daily  Current Treatment Recommendations: Strengthening, Endurance Training, Patient/Caregiver Education & Training, Self-Care / ADL, Balance Training, Home Management Training, Safety Education & Training  G-Code     OutComes Score                                                  AM-PAC Score             Goals  Short term goals  Time Frame for Short term goals: STG=LTG   Long term goals  Time Frame for Long term goals : 7 days  Long term goal 1: Pt will be independent w/ LE dressing-ongoing   Long term goal 2: Pt will be independent w/ UE dressing including donning TLSO brace-ongoing   Long term goal 3: Pt will tolerate stance for 10 mins MOD I to complete meal prep task -ongoing   Long term goal 4: Pt will be MOD I w/ tub transfer -ongoing   Long term goal 5: Pt will complete bathing tasks MOD I - goal met UB 5/18, ongoing for LE bathing   Patient Goals   Patient goals : \"get out of here in a week\" \"get my balance\"        Therapy Time   Individual Concurrent Group Co-treatment   Time In 6848         Time Out 0840         Minutes 59                Second Session Therapy Time:   Individual Concurrent Group Co-treatment   Time In  2981         Time Out  1215         Minutes  30           Timed Code Treatment Minutes: 30      Total Treatment Minutes:  64+ 30= [facility],

## 2019-05-21 NOTE — CARE COORDINATION
Team met with patient in room to discuss her progress and anticipated discharge date. Patient gave her input as to her progress and the team Dr/PT/OT/RN and SW also provided input. The team along with patient agreement decided on discharge date 5/24/19 to home with new home O2 and outpatient Cardio-Pul therapy at [facility]. NANCY will schedule first outpatient Cardio-Pul therapy for soon after d/c.     Electronically signed by ASHWIN Zhu, MARGRET on 5/21/2019 at 9:46 AM

## 2019-05-21 NOTE — PROGRESS NOTES
Pt awake in chair. Physical assessment and vital signs as charted. Pt currently rates her pain as a 5 out of 10 on the pain scale, Pt repositioned herself in her chair. Call light placed within reach. RN will continue to monitor Pt.

## 2019-05-21 NOTE — PROGRESS NOTES
Nat Ser  5/21/2019  [id number]    Chief Complaint: Multiple trauma    Subjective:   Still w/ productive cough and occasional dyspnea. Up ad louisa in room. Making good progress. ROS: No CP, SOB, dyspnea    Objective:  Patient Vitals for the past 24 hrs:   BP Temp Temp src Pulse Resp SpO2   05/1950 115/62 98.5 °F (36.9 °C) Oral 84 18 93 %   05/20/19 1032 -- -- -- -- -- 91 %   05/20/19 1021 119/66 98.3 °F (36.8 °C) Oral 80 16 (!) 89 %     Gen: No distress, pleasant. Resting in bed  HEENT: Normocephalic, atraumatic. CV: Regular rate and rhythm. No MRG  Resp: No respiratory distress. Coarse BS L side  Abd: Soft, nontender nondistended   Ext: No edema. Neuro: Alert, oriented, appropriately interactive. Laboratory data: Available via EMR. Therapy progress:  PT  Required Braces or Orthoses  Spinal: Thoracic Lumbar Sacral Orthotics  Position Activity Restriction  Other position/activity restrictions: TLSO OOB. Pt may shower w/o TLSO. No lifting more than 10lbs. No bending, twisting, pushing or pulling. Up ad louisa in room only  with RW as of 5/20/2019  Objective     Sit to Stand: Modified independent(With use of the RW)  Stand to sit: Modified independent  Bed to Chair: Contact guard assistance(Able to take a few steps )  Stand Pivot Transfers: Contact guard assistance  Device: Rolling Walker  Other Apparatus: O2(4 L)  Assistance: Contact guard assistance  Distance: 100'x2, short distances in the room  OT  PT Equipment Recommendations  Other: Ongoing assessment at this time  Toilet - Technique: Ambulating  Equipment Used: Standard toilet  Toilet Transfers Comments: VCs for safe descent due to low height of toilet   Assessment        SLP                Body mass index is 20.82 kg/m².     Assessment:  Patient Active Problem List   Diagnosis    Esophageal varices in other disease    Hemochromatosis    DM type 2 (diabetes mellitus, type 2) ([facility] [address]    GERD (gastroesophageal reflux disease)    Thrombocytopenia ([facility] [address] [facility]    Portal hypertension ([facility] [address]    Gastrointestinal hemorrhage with melena    Hypotension    Esophageal varices (H[facility])    Acute upper GI bleeding    Cirrhosis due to hemochromatosis    Respiratory failure following trauma and surgery ([facility] [address]    Bleeding esophageal varices (HCC)    Multiple trauma       Plan:   Multiple trauma: pain control and pulmonary toilet for rib fractures. TLSO for T3 compression fx. PT/OT     OLT: cellcept and prograf levels appropriate at . CMP appropriate on admission.     Neuropathy: gabapentin     HTN: inderal       Depression: celexa     CAD: ASA    Respiratory failure: Remains on O2 supplementation, complicated by rib fractures, stress IS, Duoneb PRN. Plan for home oxygen evaluation on Thursday.      Bowels: Per protocol  Bladder: Per protocol   Sleep: Trazodone provided prn. Pain: Robaxin scheduled, crow PRN  DVT PPx: Lovenox     Vincent Michele MD 5/21/2019, 9:22 AM

## 2019-05-22 PROCEDURE — 1280000000 HC REHAB R&B

## 2019-05-22 PROCEDURE — 97110 THERAPEUTIC EXERCISES: CPT | Performed by: PHYSICAL THERAPIST

## 2019-05-22 PROCEDURE — 6370000000 HC RX 637 (ALT 250 FOR IP): Performed by: PHYSICAL MEDICINE & REHABILITATION

## 2019-05-22 PROCEDURE — 97530 THERAPEUTIC ACTIVITIES: CPT

## 2019-05-22 PROCEDURE — 97116 GAIT TRAINING THERAPY: CPT

## 2019-05-22 PROCEDURE — 6360000002 HC RX W HCPCS: Performed by: PHYSICAL MEDICINE & REHABILITATION

## 2019-05-22 PROCEDURE — 97530 THERAPEUTIC ACTIVITIES: CPT | Performed by: PHYSICAL THERAPIST

## 2019-05-22 PROCEDURE — 97116 GAIT TRAINING THERAPY: CPT | Performed by: PHYSICAL THERAPIST

## 2019-05-22 RX ADMIN — CHOLECALCIFEROL (VITAMIN D3) 10 MCG (400 UNIT) TABLET 1000 UNITS: at 07:42

## 2019-05-22 RX ADMIN — METHOCARBAMOL TABLETS 1000 MG: 500 TABLET, COATED ORAL at 14:56

## 2019-05-22 RX ADMIN — CITALOPRAM HYDROBROMIDE 40 MG: 20 TABLET ORAL at 07:43

## 2019-05-22 RX ADMIN — ASPIRIN 81 MG 81 MG: 81 TABLET ORAL at 07:43

## 2019-05-22 RX ADMIN — OXYCODONE HYDROCHLORIDE 5 MG: 5 TABLET ORAL at 14:56

## 2019-05-22 RX ADMIN — METHOCARBAMOL TABLETS 1000 MG: 500 TABLET, COATED ORAL at 21:04

## 2019-05-22 RX ADMIN — OXYCODONE HYDROCHLORIDE 5 MG: 5 TABLET ORAL at 06:37

## 2019-05-22 RX ADMIN — TACROLIMUS 2 MG: 1 CAPSULE ORAL at 21:06

## 2019-05-22 RX ADMIN — PROPRANOLOL HYDROCHLORIDE 10 MG: 20 TABLET ORAL at 07:42

## 2019-05-22 RX ADMIN — TACROLIMUS 2 MG: 1 CAPSULE ORAL at 07:43

## 2019-05-22 RX ADMIN — PROPRANOLOL HYDROCHLORIDE 10 MG: 20 TABLET ORAL at 21:04

## 2019-05-22 RX ADMIN — METHOCARBAMOL TABLETS 1000 MG: 500 TABLET, COATED ORAL at 07:42

## 2019-05-22 RX ADMIN — GABAPENTIN 300 MG: 300 CAPSULE ORAL at 07:42

## 2019-05-22 RX ADMIN — PRAVASTATIN SODIUM 10 MG: 10 TABLET ORAL at 21:06

## 2019-05-22 RX ADMIN — GABAPENTIN 300 MG: 300 CAPSULE ORAL at 21:05

## 2019-05-22 RX ADMIN — MYCOPHENOLATE MOFETIL 500 MG: 500 TABLET, FILM COATED ORAL at 21:06

## 2019-05-22 RX ADMIN — OXYCODONE HYDROCHLORIDE 5 MG: 5 TABLET ORAL at 21:04

## 2019-05-22 RX ADMIN — GABAPENTIN 300 MG: 300 CAPSULE ORAL at 14:56

## 2019-05-22 RX ADMIN — MYCOPHENOLATE MOFETIL 500 MG: 500 TABLET, FILM COATED ORAL at 09:39

## 2019-05-22 RX ADMIN — ENOXAPARIN SODIUM 40 MG: 40 INJECTION SUBCUTANEOUS at 07:46

## 2019-05-22 ASSESSMENT — PAIN SCALES - GENERAL
PAINLEVEL_OUTOF10: 5
PAINLEVEL_OUTOF10: 6

## 2019-05-22 ASSESSMENT — PAIN DESCRIPTION - ORIENTATION
ORIENTATION: LEFT

## 2019-05-22 ASSESSMENT — PAIN DESCRIPTION - ONSET: ONSET: ON-GOING

## 2019-05-22 ASSESSMENT — PAIN DESCRIPTION - LOCATION
LOCATION: RIB CAGE

## 2019-05-22 ASSESSMENT — PAIN DESCRIPTION - PAIN TYPE
TYPE: ACUTE PAIN

## 2019-05-22 ASSESSMENT — PAIN DESCRIPTION - FREQUENCY
FREQUENCY: INTERMITTENT
FREQUENCY: INTERMITTENT

## 2019-05-22 ASSESSMENT — PAIN DESCRIPTION - DESCRIPTORS
DESCRIPTORS: SHARP

## 2019-05-22 ASSESSMENT — PAIN DESCRIPTION - PROGRESSION: CLINICAL_PROGRESSION: NOT CHANGED

## 2019-05-22 NOTE — CARE COORDINATION
SW contacted [facility] OP Cardio-Pulmonary therapy to schedule appointment for patient. SW was informed that a patient must have a CHF diagnosis or Pulmonary DX - with a series of Pulmonary tests to support the need for Cardio-Pul therapy. Patient does not have these as current reason for Cardio-Pulm Therapy - and the  said they usually have patients see PT as OP prior to going to Cardio-Pulm therapy for eval for the activities involved in Cardio-Pulm are usually group activities with equipment. Patient given RX for OP PT.      NANCY informed patient of the Cardio-Pulm therapy situation and she is agreeable to the OP PT. Patient prefers to schedule OP PT herself after seeing her PCP Dr. Marlys Saint soon after d/c from ARU.     Electronically signed by Armando Kidd, ASHWIN, HECTORW on 5/22/2019 at 3:27 PM

## 2019-05-22 NOTE — PROGRESS NOTES
Physical Therapy  Facility/Department: [place] [address] ACUTE REHAB UNIT  Daily Treatment Note  NAME: Vishnu Sanders  : 1950  MRN: [id number]    Date of Service: 2019    Discharge Recommendations:  Home independently, Home with Home health PT   PT Equipment Recommendations  Equipment Needed: No    Patient Diagnosis(es): There were no encounter diagnoses. 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, COPD (chronic obstructive pulmonary disease) ([facility] [address] Diabetes mellitus ([facility] [address] End stage liver disease ([facility] [address] Esophageal varices with bleeding(456.0), GERD (gastroesophageal reflux disease), GERD (gastroesophageal reflux disease), History of blood transfusion, Hyperlipidemia, Hypertension, IBS (irritable bowel syndrome), Liver disease, NSTEMI (non-ST elevated myocardial infarction) ([facility] [address] Pneumonia, Rectal varices, and Unspecified diseases of blood and blood-forming organs. has a past surgical history that includes Cholecystectomy ();  section (); Upper gastrointestinal endoscopy (11); Colonoscopy; Colonoscopy; Upper gastrointestinal endoscopy (); Upper gastrointestinal endoscopy (2011); Upper gastrointestinal endoscopy (11); Upper gastrointestinal endoscopy (11); Endoscopy, colon, diagnostic; Upper gastrointestinal endoscopy (10-6-14); Colonoscopy (11/10/14); Upper gastrointestinal endoscopy (11/18/15); and fracture surgery. Restrictions  Restrictions/Precautions  Required Braces or Orthoses?: Yes  Required Braces or Orthoses  Spinal: Thoracic Lumbar Sacral Orthotics  Position Activity Restriction  Other position/activity restrictions: TLSO OOB. Pt may shower w/o TLSO. No lifting more than 10lbs. No bending, twisting, pushing or pulling. Up ad louisa in room only  with RW as of 2019  Subjective   General  Chart Reviewed:  Yes  Additional Pertinent Hx: Pt is 78 yo F [address] [facility] stay at [facility] for multiple trauma following fall on stairs. History of OLT secondary to cirrhosis due to hemachromatosis, COPD, DM, CAD, HTN, HLD who was admitted to [facility] on 4/30 after a fall. Imaging revealed left anterior 3-9 and left posterior 11th rib fractures. She required placement of chest tube which was performed 5/6 and subsequently removed on 5/14. She was also noted to have an anterior T3 wedge fracture and was suggested to utilize a TLSO when out of bed. Family / Caregiver Present: No  Subjective  Subjective: Pt states that she feels like her walking is improving, but still having issues with her balance. General Comment  Comments: Pt  sitting up in  BS chair when PT arrived. Pt  agreeable to therapy. Pain Screening  Patient Currently in Pain: Yes(c/o rib pain, but did not rate. Pain worsened during coughing spell.)  Vital Signs  Patient Currently in Pain: Yes(c/o rib pain, but did not rate. Pain worsened during coughing spell.)       Orientation     Cognition      Objective      Transfers  Sit to Stand: Supervision(From recliner and chair, multiple reps each surface. )  Stand to sit: Supervision  Ambulation  Ambulation?: Yes  WB Status: No WB restrictions noted in the chart  Ambulation 1  Surface: level tile  Other Apparatus: O2(PT instructed pt with transporting the O2 tank. Pt on 4L O2.)  Assistance: Stand by assistance  Quality of Gait: Narrow ACACIA, min flexed trunk, slight pathway deviation. No outward LOB. SpO2 >90% following all bouts of ambulation. Distance: 2 x 135' (limited by destination), 2 x 160', and multiple bouts of short distances in the gym  Comments: Intermittent VC for increased awareness of O2 cord. Pt preference of pulling the O2 tank vs pushing 2/2 ease of control  Stairs/Curb  Stairs?: Yes  Stairs  # Steps : 6  Stairs Height: 6\"  Rails: Bilateral  Curbs: 8\"  Device: (utilized hand rail)  Assistance: Supervision  Comment: Fluctuated between a step to and a reciprocal pattern.  Encouraged to functional endurance and strength deficits that she would benefit from continued therapy to address. Cont with PT POC. Treatment Diagnosis: Multiple trauma resulting in decreased functional mobility  Prognosis: Good  Patient Education: Educated on the importance of coughing to clear her lungs despite the rib pain. REQUIRES PT FOLLOW UP: Yes  Activity Tolerance  Activity Tolerance: Patient limited by endurance; Patient limited by fatigue  Activity Tolerance: Pt with one coughing spell then later in session attempting to prevent cough 2/2 pain. Pt educated on the importance of coughing to clear her lungs to prevent pneumonia and assist in decreasing her O2 need. G-Code     OutComes Score                                                  AM-PAC Score             Goals  Short term goals  Time Frame for Short term goals: 7 days   Short term goal 1: Ind bed mobility  ONGOING  Short term goal 2: Ind with transf excluding floor transf Goal achieved  Short term goal 3: Ind with amb with LRAD >300' at a time  ONGOING  Short term goal 4: Amb up and down 6 steps with MI (note, pt reports that she does not \"do\" steps other than to get into home.  The 6 step goal is a strengthening goal also)  ONGOING  Long term goals  Time Frame for Long term goals : STG=LTG  Patient Goals   Patient goals : Decrease pain, Home w/o O2     Plan    Plan  Times per week: 5-7x week x 90 minutes  Times per day: Daily  Current Treatment Recommendations: Endurance Training, Wheelchair Mobility Training, Transfer Training, Functional Mobility Training, Safety Education & Training, Positioning, Balance Training, Pain Management, Stair training, Gait Training, Strengthening, Patient/Caregiver Education & Training  Safety Devices  Type of devices: Call light within reach, Chair alarm in place, Left in chair, Nurse notified  Restraints  Initially in place: No     Therapy Time   Individual Concurrent Group Co-treatment   Time In 0830         Time Out 0900         Minutes 30              Second Session Therapy Time:   Individual Concurrent Group Co-treatment   Time In 9258         Time Out 1115         Minutes 60           Timed Code Treatment Minutes:  99+75    Total Treatment Minutes:  [facility], [facility]  0703 ( treating/documenting therapist for 2nd session)

## 2019-05-22 NOTE — PLAN OF CARE
Problem: Falls - Risk of:  Goal: Will remain free from falls  Description  Will remain free from falls  5/22/2019 1311 by Yevgeniy Cadena RN  Outcome: Met This Shift  Note:   Fall precautions in place. Patient A/ox4. Up to chair. Calls out approprietly.       Problem: PAIN  Goal: Patient's pain/discomfort is manageable  Outcome: Met This Shift     Problem: Pain:  Goal: Pain level will decrease  Description  Pain level will decrease  Outcome: Ongoing

## 2019-05-22 NOTE — PROGRESS NOTES
Catarina Marrero  5/22/2019  [id number]    Chief Complaint: Multiple trauma    Subjective:   Still on 4L. No new complaints this morning otherwise. ROS: No CP, SOB, dyspnea    Objective:  Patient Vitals for the past 24 hrs:   BP Temp Temp src Pulse Resp SpO2 Weight   05/22/19 0741 122/71 97.8 °F (36.6 °C) Oral 79 20 91 % --   05/22/19 0642 -- -- -- -- -- -- 125 lb 14.1 oz (57.1 kg)   05/21/19 2000 135/63 97.8 °F (36.6 °C) Oral 85 20 91 % --     Gen: No distress, pleasant. Resting in bed  HEENT: Normocephalic, atraumatic. CV: Regular rate and rhythm. No MRG  Resp: No respiratory distress. Coarse BS L side  Abd: Soft, nontender nondistended   Ext: No edema. Neuro: Alert, oriented, appropriately interactive. Laboratory data: Available via EMR. Therapy progress:  PT  Required Braces or Orthoses  Spinal: Thoracic Lumbar Sacral Orthotics  Position Activity Restriction  Other position/activity restrictions: TLSO OOB. Pt may shower w/o TLSO. No lifting more than 10lbs. No bending, twisting, pushing or pulling. Up ad louisa in room only  with RW as of 5/20/2019  Objective     Sit to Stand: Supervision(From recliner and chair, multiple reps each surface. )  Stand to sit: Supervision  Bed to Chair: Contact guard assistance(Able to take a few steps )  Stand Pivot Transfers: Contact guard assistance  Device: Rolling Walker  Other Apparatus: O2(PT instructed pt with transporting the O2 tank. Pt on 4L O2.)  Assistance: Stand by assistance  Distance: 2 x 135' (limited by destination), 2 x 160', and multiple bouts of short distances in the gym  OT  PT Equipment Recommendations  Equipment Needed: No  Other: Ongoing assessment at this time  Toilet - Technique: Ambulating  Equipment Used: Standard toilet  Toilet Transfers Comments: VCs for safe descent due to low height of toilet   Assessment        SLP                Body mass index is 20.32 kg/m².     Assessment:  Patient Active Problem List   Diagnosis    Esophageal varices in other disease    Hemochromatosis    DM type 2 (diabetes mellitus, type 2) (HCC)    GERD (gastroesophageal reflux disease)    Thrombocytopenia (HCC)    Portal hypertension (HCC)    Gastrointestinal hemorrhage with melena    Hypotension    Esophageal varices (HCC)    Acute upper GI bleeding    Cirrhosis due to hemochromatosis    Respiratory failure following trauma and surgery ([facility] [address]    Bleeding esophageal varices (HCC)    Multiple trauma       Plan:   Multiple trauma: pain control and pulmonary toilet for rib fractures. TLSO for T3 compression fx. PT/OT     OLT: cellcept and prograf levels appropriate at . CMP appropriate on admission.     Neuropathy: gabapentin     HTN: inderal       Depression: celexa     CAD: ASA    Respiratory failure: Remains on O2 supplementation, complicated by rib fractures, stress IS, Duoneb PRN. Plan for home oxygen evaluation on Thursday.      Bowels: Per protocol  Bladder: Per protocol   Sleep: Trazodone provided prn. Pain: Robaxin scheduled, crow PRN  DVT PPx: Lovenox     Vincent Horton MD 5/22/2019, 10:57 AM

## 2019-05-22 NOTE — PLAN OF CARE
Problem: Falls - Risk of:  Goal: Will remain free from falls  Description  Will remain free from falls  5/22/2019 0120 by Lety Crouch RN  Note:   Remains free from falls. Call light within reach. Up ad louisa in room. Modified independent for transfers. 5/21/2019 1416 by Chandrakant Silva RN  Outcome: Ongoing     Problem: SKIN INTEGRITY  Goal: Skin integrity is maintained or improved  Note:   Skin assessed with no new areas.

## 2019-05-22 NOTE — PROGRESS NOTES
Occupational Therapy  Facility/Department: [facility] ACUTE REHAB UNIT  Daily Treatment Note  NAME: Cathi Goodell  : 1950  MRN: [id number]    Date of Service: 2019    Discharge Recommendations:  Home with assist PRN, Continue to assess pending progress(cardiopulmonary therapy)  OT Equipment Recommendations  ADL Assistive Devices: Reacher  Other: Pt has a shower chair. Assessment   Performance deficits / Impairments: Decreased functional mobility ; Decreased safe awareness;Decreased balance;Decreased ADL status; Decreased high-level IADLs  Assessment: Pt completed various dynamic balance exercises this date w/ some difficulty. Pt required min A to recover LOB when alternating BLEs to complete toe taps. Pt also limited by decreased activity tolerance and requires extended rest breaks to recover from SOB. Pt continues to make progress in OT. Cont OT per POC   Treatment Diagnosis: Decreased ADL status 2/2 T3 wedge fracture, anterolateral 3rd-9th rib fractures, posterior 11th rib fracture   Patient Education: rest breaks, PLB, balance activity-pt verb understanding   REQUIRES OT FOLLOW UP: Yes  Activity Tolerance  Activity Tolerance: Patient Tolerated treatment well;Patient limited by fatigue  Activity Tolerance: Pt limited by SOB and requires extended rest breaks to recover. Pt's O2 sats dropped as low as 79% following ambulation however this increased to 96% very rapidly. VCs needed for PLB techniques   Safety Devices  Safety Devices in place: Yes  Type of devices: Call light within reach; Left in chair(pt is up ad louisa in room)         Patient Diagnosis(es): There were no encounter diagnoses.       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, COPD (chronic obstructive pulmonary disease) ([facility] [address] Diabetes mellitus ([facility] [address] End stage liver disease ([facility] [address] Esophageal varices with bleeding(456.0), GERD (gastroesophageal reflux disease), GERD (gastroesophageal reflux disease), History of blood transfusion, Hyperlipidemia, Hypertension, IBS (irritable bowel syndrome), Liver disease, NSTEMI (non-ST elevated myocardial infarction) ([facility] [address] Pneumonia, Rectal varices, and Unspecified diseases of blood and blood-forming organs. has a past surgical history that includes Cholecystectomy ();  section (); Upper gastrointestinal endoscopy (11); Colonoscopy; Colonoscopy; Upper gastrointestinal endoscopy (); Upper gastrointestinal endoscopy (2011); Upper gastrointestinal endoscopy (11); Upper gastrointestinal endoscopy (11); Endoscopy, colon, diagnostic; Upper gastrointestinal endoscopy (10-6-14); Colonoscopy (11/10/14); Upper gastrointestinal endoscopy (11/18/15); and fracture surgery. Restrictions  Restrictions/Precautions  Required Braces or Orthoses?: Yes  Required Braces or Orthoses  Spinal: Thoracic Lumbar Sacral Orthotics  Position Activity Restriction  Other position/activity restrictions: TLSO OOB. Pt may shower w/o TLSO. No lifting more than 10lbs. No bending, twisting, pushing or pulling. Up ad louisa in room only  with RW as of 2019  Subjective   General  Chart Reviewed: Yes  Patient assessed for rehabilitation services?: Yes  Additional Pertinent Hx: Pt is a 80 yo female who presents to [facility]  from [facility] s/p fall down concrete steps resulting in L anterolateral 3rd-9th rib fractures, T3 wedge fracture, and posterior 11th rib fracture. Pt's hospital stay was complicated by respiratory distress now requiring 4L O2. Pt has PMHx including: DM, GERD, high cholesterol, hypertension, IBS, hemochromatosis, liver transplant. Pt admitted to ARU   Response to previous treatment: Patient with no complaints from previous session  Family / Caregiver Present: No  Referring Practitioner: Dr Edgar Mata   Diagnosis: L anterolateral 3rd-9th rib fractures, T3 wedge fracture, and posterior 11th rib fracture.  Up ad louisa in room w/ RW   Subjective  Subjective: Pt needed in stance due to c/o rib pain. Pt denied wanting pain meds at this time but rated L rib pain at 4/10. Pt required CGA during task and w/ no LOB. VCs needed for wider ACACIA. 2) Balloon toss while maintaining stance on airex. Pt reached outside ACACIA while hitting balloon but required VCs for improved stance on airex as pt's B heels were occasionally hanging off of the side. Pt required CGA w/ one LOB requiring Min A to correct. 3) Broom hockey to simulate IADL task of sweeping the floor. Pt was able to maintain grasp on broom and hit \"puck\" into target w/ spvn and no LOB. Pt unable to bend down to the ground in order to pick \"pucks\" off of the ground due to bending restriction. 4) item retrieval/transport task completed while pt carried bucket to/from dining room and engaged in [place] the table\" task while moving items from kitchen cabinets/counters and transported to dining room table. Pt demos good safety while reaching and retrieving objects but required min VCs during task to take a rest break due to observed increased SOB. SpO2 95% during rest break. Extended rest break needed during task. Additional standing activity completed to increase activity tolerance. Pt maintained stance on airex while using arm bike for 3 mins w/ VCs needed to pace self and utilize PLB strategy. Pt completed at very slow pace but was able to complete w/o needing any rest breaks. Pt ambulated w/o device and spvn back to room. Pt left in chair. Pt is up ad louisa in the room.         Plan   Plan  Times per week: 5x a week for 90 mins daily   Times per day: Daily  Current Treatment Recommendations: Strengthening, Endurance Training, Patient/Caregiver Education & Training, Self-Care / ADL, Balance Training, Home Management Training, Safety Education & Training  G-Code     OutComes Score                                                  AM-PAC Score             Goals  Short term goals  Time Frame for Short term goals: STG=LTG   Long term goals  Time Frame for Long term goals : 7 days  Long term goal 1: Pt will be independent w/ LE dressing-ongoing   Long term goal 2: Pt will be independent w/ UE dressing including donning TLSO brace-ongoing   Long term goal 3: Pt will tolerate stance for 10 mins MOD I to complete meal prep task -ongoing   Long term goal 4: Pt will be MOD I w/ tub transfer -ongoing   Long term goal 5: Pt will complete bathing tasks MOD I - goal met UB 5/18, ongoing for LE bathing   Patient Goals   Patient goals : \"get out of here in a week\" \"get my balance\"        Therapy Time   Individual Concurrent Group Co-treatment   Time In 0755         Time Out 0830         Minutes 35             Second Session Therapy Time:   Individual Concurrent Group Co-treatment   Time In 1320         Time Out 1420         Minutes 60           Timed Code Treatment Minutes:  60    Total Treatment Minutes:  35+60= 2100 Portillo Nic, OT

## 2019-05-22 NOTE — DISCHARGE INSTR - COC
Continuity of Care Form    Patient Name: Keny Law   :  1950  MRN:  [id number]    [facility] date:  2019  Discharge date:  2019    Code Status Order: Full Code   Advance Directives:   Advance Care Flowsheet Documentation     Date/Time Healthcare Directive Type of Healthcare Directive Copy in [address] Agent's Name Healthcare Agent's Phone Number    [phone]  Yes, patient has an advance directive for healthcare treatment  Durable power of  for health care  No, copy requested from family  Adult Children    Sherly Catherine (son);  Tony Chester (sister)  [phone]          Admitting Physician:  Deyanira Shin MD  PCP: Lis Reid MD    Discharging Nurse: [facility] Unit/Room#: 8851/9626-33  Discharging Unit Phone Number: [phone]    Emergency Contact:   Extended Emergency Contact Information  Primary Emergency Contact: Evan Ugarte  [place] Phone: [phone]  Relation: Brother/Sister  Secondary Emergency Contact: [address] [place] [address] Phone: [phone]  Relation: Child    Past Surgical History:  Past Surgical History:   Procedure Laterality Date   [facility]      polypectomy    COLONOSCOPY      COLONOSCOPY  11/10/14    ENDOSCOPY, COLON, DIAGNOSTIC      FRACTURE SURGERY      UPPER GASTROINTESTINAL ENDOSCOPY  11    UPPER GASTROINTESTINAL ENDOSCOPY      w banding    UPPER GASTROINTESTINAL ENDOSCOPY  2011    UPPER GASTROINTESTINAL ENDOSCOPY  11    with banding    UPPER GASTROINTESTINAL ENDOSCOPY  11    UPPER GASTROINTESTINAL ENDOSCOPY  10-6-14    with banding    UPPER GASTROINTESTINAL ENDOSCOPY  11/18/15    with EUS and banding       Immunization History:   Immunization History   Administered Date(s) Administered    Influenza Virus Vaccine 2010, 10/15/2014    Influenza Whole 10/28/2015    Pneumococcal Polysaccharide ([id number]) 10/07/2014       Active Problems:  Patient Active Problem List   Diagnosis Code    Esophageal varices in other disease I85.10    Hemochromatosis E83.119    DM type 2 (diabetes mellitus, type 2) ([facility] E11.9    GERD (gastroesophageal reflux disease) K21.9    Thrombocytopenia (HCC) D69.6    Portal hypertension ([facility] K76.6    Gastrointestinal hemorrhage with melena K92.1    Hypotension I95.9    Esophageal varices (HCC) I85.00    Acute upper GI bleeding K92.2    Cirrhosis due to hemochromatosis E83.10    Respiratory failure following trauma and surgery ([facility] J95.821    Bleeding esophageal varices (HCC) I85.01    Multiple trauma T07. Nathaniel Toledo       Isolation/Infection:   Isolation          No Isolation            Nurse Assessment:  Last Vital Signs: /71   Pulse 79   Temp 97.8 °F (36.6 °C) (Oral)   Resp 20   Ht 5' 6\" (1.676 m)   Wt 125 lb 14.1 oz (57.1 kg)   SpO2 91%   Breastfeeding? No   BMI 20.32 kg/m²     Last documented pain score (0-10 scale): Pain Level: 5  Last Weight:   Wt Readings from Last 1 Encounters:   05/22/19 125 lb 14.1 oz (57.1 kg)     Mental Status:  oriented and alert    IV Access:  - None    Nursing Mobility/ADLs:  Walking   Independent  Transfer  Independent  Bathing  Independent  Dressing  Independent  Toileting  Independent  Feeding  Independent  Med Admin  Assisted  Med Delivery   whole    Wound Care Documentation and Therapy:        Elimination:  Continence:   · Bowel: Yes  · Bladder: Yes  Urinary Catheter: None   Colostomy/Ileostomy/Ileal Conduit: No       Date of Last BM: 05/24/2019    Intake/Output Summary (Last 24 hours) at 5/22/2019 1527  Last data filed at 5/22/2019 1245  Gross per 24 hour   Intake 440 ml   Output --   Net 440 ml     I/O last 3 completed shifts: In: 26 [P.O.:440]  Out: -     Safety Concerns:      At Risk for Falls    Impairments/Disabilities:      Vision    Nutrition Therapy:  Current Nutrition Therapy:   - Oral Diet: General    Routes of Feeding: Oral  Liquids: Thin Liquids  Daily Fluid Restriction: no  Last Modified Barium Swallow with Video (Video Swallowing Test): not done    Treatments at the Time of Hospital Discharge:   Respiratory Treatments: YES  Oxygen Therapy:  is on oxygen at 3 L/min per nasal cannula. Ventilator:    - No ventilator support    Rehab Therapies: Physical Therapy and Occupational Therapy  Weight Bearing Status/Restrictions: No weight bearing restirctions  Other Medical Equipment (for information only, NOT a DME order):  walker  Other Treatments: tlso brace    Patient's personal belongings (please select all that are sent with patient):  Glasses sent with patient    RN SIGNATURE:  Electronically signed by Vidal Story RN on 5/24/19 at 12:21 PM    CASE MANAGEMENT/SOCIAL WORK SECTION    Inpatient Status Date: 5/16/19    Readmission Risk Assessment Score:  Readmission Risk              Risk of Unplanned Readmission:        12           Discharging to Facility/ Agency     Patient given RX for Outpatient PT. [place] O2 from [facility]. / signature: Electronically signed by ASHWIN Raymond, LSW on 5/22/19 at 3:27 PM    PHYSICIAN SECTION    Prognosis: {Prognosis:1094129112}    Condition at Discharge: [facility] Patient Condition:333186534}    Rehab Potential (if transferring to Rehab): {Prognosis:6355601580}    Recommended Labs or Other Treatments After Discharge: ***    Physician Certification: I certify the above information and transfer of Ximena Yates  is necessary for the continuing treatment of the diagnosis listed and that she requires {Admit to Appropriate Level of Care:99758} for {GREATER/LESS:982420603} 30 days.      Update Admission H&P: {CHP DME Changes in QNXMW:690711641}    PHYSICIAN SIGNATURE:  {Esignature:[id number]}

## 2019-05-23 LAB
A/G RATIO: 1.5 (ref 1.1–2.2)
ALBUMIN SERPL-MCNC: 3.7 G/DL (ref 3.4–5)
ALP BLD-CCNC: 97 U/L (ref 40–129)
ALT SERPL-CCNC: 9 U/L (ref 10–40)
ANION GAP SERPL CALCULATED.3IONS-SCNC: 8 MMOL/L (ref 3–16)
AST SERPL-CCNC: 20 U/L (ref 15–37)
BILIRUB SERPL-MCNC: 0.8 MG/DL (ref 0–1)
BUN BLDV-MCNC: 23 MG/DL (ref 7–20)
CALCIUM SERPL-MCNC: 9.4 MG/DL (ref 8.3–10.6)
CHLORIDE BLD-SCNC: 102 MMOL/L (ref 99–110)
CO2: 31 MMOL/L (ref 21–32)
CREAT SERPL-MCNC: 0.9 MG/DL (ref 0.6–1.2)
GFR AFRICAN AMERICAN: >60
GFR NON-AFRICAN AMERICAN: >60
GLOBULIN: 2.5 G/DL
GLUCOSE BLD-MCNC: 105 MG/DL (ref 70–99)
HCT VFR BLD CALC: 23.6 % (ref 36–48)
HEMOGLOBIN: 7.9 G/DL (ref 12–16)
MCH RBC QN AUTO: 33.4 PG (ref 26–34)
MCHC RBC AUTO-ENTMCNC: 33.6 G/DL (ref 31–36)
MCV RBC AUTO: 99.3 FL (ref 80–100)
PDW BLD-RTO: 16.4 % (ref 12.4–15.4)
PLATELET # BLD: 172 K/UL (ref 135–450)
PMV BLD AUTO: 6.7 FL (ref 5–10.5)
POTASSIUM SERPL-SCNC: 4.3 MMOL/L (ref 3.5–5.1)
RBC # BLD: 2.38 M/UL (ref 4–5.2)
SODIUM BLD-SCNC: 141 MMOL/L (ref 136–145)
TOTAL PROTEIN: 6.2 G/DL (ref 6.4–8.2)
WBC # BLD: 1.6 K/UL (ref 4–11)

## 2019-05-23 PROCEDURE — 6360000002 HC RX W HCPCS: Performed by: PHYSICAL MEDICINE & REHABILITATION

## 2019-05-23 PROCEDURE — 94760 N-INVAS EAR/PLS OXIMETRY 1: CPT

## 2019-05-23 PROCEDURE — 80053 COMPREHEN METABOLIC PANEL: CPT

## 2019-05-23 PROCEDURE — 6370000000 HC RX 637 (ALT 250 FOR IP): Performed by: PHYSICAL MEDICINE & REHABILITATION

## 2019-05-23 PROCEDURE — 97116 GAIT TRAINING THERAPY: CPT | Performed by: PHYSICAL THERAPIST

## 2019-05-23 PROCEDURE — 36415 COLL VENOUS BLD VENIPUNCTURE: CPT

## 2019-05-23 PROCEDURE — 94664 DEMO&/EVAL PT USE INHALER: CPT

## 2019-05-23 PROCEDURE — 85027 COMPLETE CBC AUTOMATED: CPT

## 2019-05-23 PROCEDURE — 97535 SELF CARE MNGMENT TRAINING: CPT

## 2019-05-23 PROCEDURE — 97530 THERAPEUTIC ACTIVITIES: CPT | Performed by: PHYSICAL THERAPIST

## 2019-05-23 PROCEDURE — 1280000000 HC REHAB R&B

## 2019-05-23 PROCEDURE — 97116 GAIT TRAINING THERAPY: CPT

## 2019-05-23 PROCEDURE — 94680 O2 UPTK RST&XERS DIR SIMPLE: CPT

## 2019-05-23 PROCEDURE — 97530 THERAPEUTIC ACTIVITIES: CPT

## 2019-05-23 PROCEDURE — 97112 NEUROMUSCULAR REEDUCATION: CPT

## 2019-05-23 RX ORDER — PROPRANOLOL HYDROCHLORIDE 10 MG/1
10 TABLET ORAL 2 TIMES DAILY
Qty: 90 TABLET | Refills: 3 | Status: SHIPPED | OUTPATIENT
Start: 2019-05-23

## 2019-05-23 RX ORDER — MYCOPHENOLATE MOFETIL 500 MG/1
500 TABLET ORAL 2 TIMES DAILY
Qty: 60 TABLET | Refills: 3 | Status: SHIPPED | OUTPATIENT
Start: 2019-05-23

## 2019-05-23 RX ORDER — GABAPENTIN 300 MG/1
300 CAPSULE ORAL 3 TIMES DAILY
Qty: 90 CAPSULE | Refills: 0 | Status: SHIPPED | OUTPATIENT
Start: 2019-05-23 | End: 2019-06-22

## 2019-05-23 RX ORDER — TACROLIMUS 1 MG/1
2 CAPSULE ORAL 2 TIMES DAILY
Qty: 60 CAPSULE | Refills: 3 | Status: SHIPPED | OUTPATIENT
Start: 2019-05-23

## 2019-05-23 RX ORDER — LIDOCAINE 4 G/G
1 PATCH TOPICAL DAILY
Qty: 30 PATCH | Refills: 0 | Status: SHIPPED | OUTPATIENT
Start: 2019-05-24

## 2019-05-23 RX ORDER — METHOCARBAMOL 500 MG/1
500 TABLET, FILM COATED ORAL 3 TIMES DAILY PRN
Qty: 30 TABLET | Refills: 0 | Status: SHIPPED | OUTPATIENT
Start: 2019-05-23 | End: 2019-06-02

## 2019-05-23 RX ORDER — OXYCODONE HYDROCHLORIDE 5 MG/1
5 TABLET ORAL EVERY 6 HOURS PRN
Qty: 30 TABLET | Refills: 0 | Status: SHIPPED | OUTPATIENT
Start: 2019-05-23 | End: 2019-05-30

## 2019-05-23 RX ADMIN — OXYCODONE HYDROCHLORIDE 5 MG: 5 TABLET ORAL at 08:38

## 2019-05-23 RX ADMIN — GABAPENTIN 300 MG: 300 CAPSULE ORAL at 14:44

## 2019-05-23 RX ADMIN — PROPRANOLOL HYDROCHLORIDE 10 MG: 20 TABLET ORAL at 20:51

## 2019-05-23 RX ADMIN — TACROLIMUS 2 MG: 1 CAPSULE ORAL at 20:52

## 2019-05-23 RX ADMIN — ENOXAPARIN SODIUM 40 MG: 40 INJECTION SUBCUTANEOUS at 08:24

## 2019-05-23 RX ADMIN — TACROLIMUS 2 MG: 1 CAPSULE ORAL at 08:31

## 2019-05-23 RX ADMIN — CHOLECALCIFEROL (VITAMIN D3) 10 MCG (400 UNIT) TABLET 1000 UNITS: at 08:23

## 2019-05-23 RX ADMIN — ASPIRIN 81 MG 81 MG: 81 TABLET ORAL at 08:23

## 2019-05-23 RX ADMIN — METHOCARBAMOL TABLETS 1000 MG: 500 TABLET, COATED ORAL at 14:44

## 2019-05-23 RX ADMIN — METHOCARBAMOL TABLETS 1000 MG: 500 TABLET, COATED ORAL at 08:23

## 2019-05-23 RX ADMIN — CITALOPRAM HYDROBROMIDE 40 MG: 20 TABLET ORAL at 08:23

## 2019-05-23 RX ADMIN — PRAVASTATIN SODIUM 10 MG: 10 TABLET ORAL at 20:51

## 2019-05-23 RX ADMIN — GABAPENTIN 300 MG: 300 CAPSULE ORAL at 08:23

## 2019-05-23 RX ADMIN — METHOCARBAMOL TABLETS 1000 MG: 500 TABLET, COATED ORAL at 20:50

## 2019-05-23 RX ADMIN — MYCOPHENOLATE MOFETIL 500 MG: 500 TABLET, FILM COATED ORAL at 08:31

## 2019-05-23 RX ADMIN — GABAPENTIN 300 MG: 300 CAPSULE ORAL at 20:51

## 2019-05-23 RX ADMIN — PROPRANOLOL HYDROCHLORIDE 10 MG: 20 TABLET ORAL at 08:22

## 2019-05-23 RX ADMIN — OXYCODONE HYDROCHLORIDE 5 MG: 5 TABLET ORAL at 14:44

## 2019-05-23 RX ADMIN — MYCOPHENOLATE MOFETIL 500 MG: 500 TABLET, FILM COATED ORAL at 20:51

## 2019-05-23 ASSESSMENT — PAIN SCALES - GENERAL
PAINLEVEL_OUTOF10: 3
PAINLEVEL_OUTOF10: 5
PAINLEVEL_OUTOF10: 8
PAINLEVEL_OUTOF10: 5
PAINLEVEL_OUTOF10: 8
PAINLEVEL_OUTOF10: 8

## 2019-05-23 ASSESSMENT — PAIN DESCRIPTION - LOCATION
LOCATION: RIB CAGE

## 2019-05-23 ASSESSMENT — PAIN DESCRIPTION - PAIN TYPE
TYPE: ACUTE PAIN

## 2019-05-23 ASSESSMENT — PAIN DESCRIPTION - ONSET
ONSET: ON-GOING

## 2019-05-23 ASSESSMENT — PAIN DESCRIPTION - FREQUENCY
FREQUENCY: INTERMITTENT

## 2019-05-23 ASSESSMENT — PAIN DESCRIPTION - DESCRIPTORS
DESCRIPTORS: SHARP
DESCRIPTORS: ACHING;SHARP
DESCRIPTORS: ACHING;SHARP

## 2019-05-23 ASSESSMENT — PAIN DESCRIPTION - ORIENTATION
ORIENTATION: LEFT

## 2019-05-23 ASSESSMENT — PAIN DESCRIPTION - PROGRESSION
CLINICAL_PROGRESSION: NOT CHANGED
CLINICAL_PROGRESSION: NOT CHANGED

## 2019-05-23 NOTE — PROGRESS NOTES
Occupational Therapy  Facility/Department: [facility] ACUTE REHAB UNIT  Daily Treatment Note/Discharge Summary   NAME: Shaka Cisneros  : 1950  MRN: [id number]    Date of Service: 2019    Discharge Recommendations:  Home with assist PRN, Continue to assess pending progress  OT Equipment Recommendations  ADL Assistive Devices: Reacher  Other: Pt has a shower chair. Assessment   Performance deficits / Impairments: Decreased functional mobility ; Decreased safe awareness;Decreased balance;Decreased ADL status; Decreased high-level IADLs  Assessment: Pt has demonstrated good progress in OT since admit and is now completing all ADLs MOD I- Independent. Pt is able to complete simple IADL tasks w/ MOD I but benefits from PRN assistance for laundry and heavy lifting as pt is unable to lift over 10lbs. Pt is on 3L of O2 and remains mildly limited by SOB and decreased activity tolerance. Pt is returning home w/ PRN assistance and no ongoing OT needed at this time. Treatment Diagnosis: Decreased ADL status 2/2 T3 wedge fracture, anterolateral 3rd-9th rib fractures, posterior 11th rib fracture     Patient Education: PLB and rest breaks, pain management, donning TLSO- pt verb understanding   REQUIRES OT FOLLOW UP: Yes  Activity Tolerance  Activity Tolerance: Patient limited by pain; Patient limited by fatigue  Activity Tolerance: Pt was limited by 7/10 L ribcage pain which created increased SOB. Pt required increased time for ADLs and extended rest breaks   Safety Devices  Safety Devices in place: Yes  Type of devices: Call light within reach; Left in chair(pt is up ad louisa in room)         Patient Diagnosis(es): The encounter diagnosis was Multiple trauma.       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, COPD (chronic obstructive pulmonary disease) ([facility] [address] Diabetes mellitus ([facility] [address] End stage liver disease ([facility] [address] Esophageal varices with bleeding(456.0), GERD (gastroesophageal reflux shower I'ly. Pt completed UE and LE bathing in stance and seated on shower chair w/ use of HH shower MOD I. Pt used grab bar in stance for balance and w/ no LOB. Pt dried self in stance I'ly and donned pants and underwear in stance I'ly. Shirt donned and TLSO donned I'ly. Pt combed hair in stance at sink I'ly. Rest break needed following bathing and dressing tasks. Pt ambulated w/o device and spvn to therapy gym, assist needed to manage O2 tank. Pt engaged in various balance tasks to increase safety w/ IADLs. Pt completed cone taps to elevated surface w/ 1-3 color sequence. Pt w/ SBA to complete 1-2 color sequence but CGA-Min A w/ 3 colors sequence. Pt also completed forward step x 15 reps while maintaining grasp on large swiss ball. Pt completed w/ very guarded movement due to L rib pain. Rest break needed following activity. OT then educated pt on various exercises pt can complete in order to maintain activity and increase strength once d/c home. Pt completed sit to supine on wedge on mat table MOD I. Pt completed SLR + shoulder flexion RUE x 15 reps. Pt attempted SLR RLE + shoulder flexion w/ LUE however only able to complete to ~90 degrees LUE due to increased rib pain w/ LUE movement. Pt then had a coughing episode and requested to sit back up. Pt required increased time before feeling ok to walk back to room. Pt ambulated w/o device and spvn back to room. OT addressed d/c plans and pt's questions. Pt was asking about when she could return to work which OT explained that this should be deferred to MD. Pt was left seated in recliner chair.          Plan   Plan  Times per week: 5x a week for 90 mins daily   Times per day: Daily  Current Treatment Recommendations: Strengthening, Endurance Training, Patient/Caregiver Education & Training, Self-Care / ADL, Balance Training, Home Management Training, Safety Education & Training  G-Code     OutComes Score                                                  AM-PAC Score Goals  Short term goals  Time Frame for Short term goals: STG=LTG   Long term goals  Time Frame for Long term goals : 7 days  Long term goal 1: Pt will be independent w/ LE dressing-goal met 5/23   Long term goal 2: Pt will be independent w/ UE dressing including donning TLSO brace-goal met 5/23   Long term goal 3: Pt will tolerate stance for 10 mins MOD I to complete meal prep task -goal met 5/22 for IADL task   Long term goal 4: Pt will be MOD I w/ tub transfer -goal met 5/21 via dry transfer   Long term goal 5: Pt will complete bathing tasks MOD I - goal met UB 5/18, met for LE bathing 5/23   Patient Goals   Patient goals : \"get out of here in a week\" \"get my balance\"        Therapy Time   Individual Concurrent Group Co-treatment   Time In 0830         Time Out 0900         Minutes 30             Second Session Therapy Time:   Individual Concurrent Group Co-treatment   Time In  5606         Time Out  1145         Minutes  60           Timed Code Treatment Minutes:  60    Total Treatment Minutes:  30+60= [facility]  [address], OT

## 2019-05-23 NOTE — PROGRESS NOTES
Physical Therapy  Facility/Department: [address] [place] [address]  Discharge Summary/ Daily Treatment Note  NAME: Kannan Ramachandran  : 1950  MRN: [id number]    Date of Service: 2019    Discharge Recommendations:  Home independently, Outpatient PT   PT Equipment Recommendations  Equipment Needed: No    Patient Diagnosis(es): The encounter diagnosis was Multiple trauma. 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, COPD (chronic obstructive pulmonary disease) ([facility] [address] Diabetes mellitus ([address] End stage liver disease ([facility] [address] Esophageal varices with bleeding(456.0), GERD (gastroesophageal reflux disease), GERD (gastroesophageal reflux disease), History of blood transfusion, Hyperlipidemia, Hypertension, IBS (irritable bowel syndrome), Liver disease, NSTEMI (non-ST elevated myocardial infarction) ([facility] [address] Pneumonia, Rectal varices, and Unspecified diseases of blood and blood-forming organs. has a past surgical history that includes Cholecystectomy ();  section (); Upper gastrointestinal endoscopy (11); Colonoscopy; Colonoscopy; Upper gastrointestinal endoscopy (); Upper gastrointestinal endoscopy (2011); Upper gastrointestinal endoscopy (11); Upper gastrointestinal endoscopy (11); Endoscopy, colon, diagnostic; Upper gastrointestinal endoscopy (10-6-14); Colonoscopy (11/10/14); Upper gastrointestinal endoscopy (11/18/15); and fracture surgery. Restrictions  Restrictions/Precautions  Required Braces or Orthoses?: Yes  Required Braces or Orthoses  Spinal: Thoracic Lumbar Sacral Orthotics  Position Activity Restriction  Other position/activity restrictions: TLSO OOB. Pt may shower w/o TLSO. No lifting more than 10lbs. No bending, twisting, pushing or pulling. Up ad louisa in room only  with RW as of 2019  Subjective   General  Chart Reviewed:  Yes  Additional Pertinent Hx: Pt is 78 yo F admitted to [facility] ARU on 19 from acute stay at [facility] for multiple trauma following fall on stairs. History of OLT secondary to cirrhosis due to hemachromatosis, COPD, DM, CAD, HTN, HLD who was admitted to [facility] on 4/30 after a fall. Imaging revealed left anterior 3-9 and left posterior 11th rib fractures. She required placement of chest tube which was performed 5/6 and subsequently removed on 5/14. She was also noted to have an anterior T3 wedge fracture and was suggested to utilize a TLSO when out of bed. Family / Caregiver Present: No  Subjective  Subjective: Pt reports that \"she feels that she will be fine at home. Pt states that her cats may be her biggest obstacle. \"  General Comment  Comments: Pt  sitting up in  BS chair when PT arrived. Pt  agreeable to therapy. Pain Screening  Patient Currently in Pain: Yes  Pain Assessment  Pain Level: 8  Patient's Stated Pain Goal: No pain  Pain Type: Acute pain  Pain Location: Rib cage  Pain Orientation: Left  Pain Descriptors: Aching; Sharp  Pain Frequency: Intermittent  Pain Onset: On-going  Clinical Progression: Not changed  Functional Pain Assessment: Prevents or interferes some active activities and ADLs  Non-Pharmaceutical Pain Intervention(s): Repositioned; Emotional support; Ambulation/Increased Activity; Therapeutic presence  Response to Pain Intervention: None(Pt was given armando meds at 9. )  Vital Signs  Patient Currently in Pain: Yes       Orientation  Orientation  Overall Orientation Status: Within Normal Limits  Cognition      Objective   Bed mobility  Bridging: Independent  Rolling to Left: (Declined 2/2 to increased pain on the L  with any pressure)  Rolling to Right: Independent  Supine to Sit: Independent  Sit to Supine: Independent;2 Person assistance  Scooting: Independent  Comment: Bed positioned flat with railings lowered to simulate home situation  Transfers  Sit to Stand: Independent  Stand to sit:  Independent  Bed to Chair: Independent  Ambulation  Ambulation?: Yes  WB Status: No WB restrictions noted in the chart  Ambulation 1  Surface: level tile  Device: No Device  Other Apparatus: O2(PT  transports the O2 tank. Pt on 4L O2.)  Assistance: Modified Independent  Quality of Gait: Narrow ACACIA, kyphotic posture,   Gait Deviations: Slow Gale  Distance: 350' x2 ( this included amb up /down a ramp x 75' in each direction) , 100' x2  Comments: Intermittent VC for increased awareness of O2 cord. Pt preference of pulling the O2 tank vs pushing 2/2 ease of control  Stairs/Curb  Stairs?: Yes  Stairs  # Steps : 12(3+3+3+3)  Stairs Height: 6\"  Rails: Bilateral  Device: No Device(utilized hand rail)  Other Apparatus: Right;Left  Assistance: Modified independent   Comment: Reciprocal pattern. As a dynamic standing balance activity, PT instructed pt with using the blue foam  and doing the following activities. These include stepping up and down leading with RLE in a forwards direction x 10 reps, leading with LLE in a forwards direction x10 reps, stepping to the R side of the blue foam x 10 reps leading with the RLE, stepping to the L side of the foam x10 reps, stepping up and then over the foam x 10 reps. Pt req SBA/ CGA  with these activities. PT also instructed pt with use of the vestibular board. Pt used the board for lateral WS  X 10 reps. Pt then performed ant/posterior WS with standing tandem. Initially the R foot was in front of L for 10 reps and then the opposite. Pt req multiple rests with these activities mostly 2/2 to SOB although LES did get shaky . 2nd session: Pt was sitting in the bedside chair upon arrival. Pt agreeable to PT intervention. Pt states that she is feeling better, but that she had a hard time with some of the balance tasks earlier with PT. Pt repeatedly asking the purpose of balance tasks since they are not activities that she does during her daily routine. Purpose and benefit of balance tasks explained multiple times throughout the session.  Pt ambulated distances of 2 x 130' MOD I (See above for gait deviations and interventions). Pt ambulated 2 x 30' of fwd and backward tandem walking with CGA-MIN A 2/2 multiple LOB. Pt needign VC during for improved technique. Pt completed 2 x 10 reps of lateral taps on the vestibular board (1st bout requiring CGA-MOD A 2/2 multiple posterior LOB, on 2nd set pt maintained her eye gaze fwd and only required CGA). Pt completed 10 taps in AP direction on vestibular board with MIN A demonstrating decreased ability to initiate fwd wt. Pt completed alternating toe taps with B LEs between a 6 and 12 inch step to therapist command without UE support with CGA-GRANT . Pt completed multiple reps of sit <> stand transfers throughout the session Independently. Pt was sitting in the bedside chair at the end of the session with call light and all needs within reach. Assessment   Body structures, Functions, Activity limitations: Decreased functional mobility ; Decreased endurance;Decreased balance; Increased Pain  Assessment: Pt has progressed well with therapy but continues to be limited by SOB, pain ( specifically in the L rib cage) and decreased endurance. Pt has achieved all of the previously set goals . Despite O2 sats dropping to low 80's, pt reports that she is not concerned about her oxygen levels. Pt still presents with higher level balance deficits and decreased endurance. Pt would benefit from continued therapy to maximize potential and increase overall functional mobility towards Ind for a safer return to home. Treatment Diagnosis: Multiple trauma resulting in decreased functional mobility  Prognosis: Good  Patient Education: Transf training, gt training, bed mobility , stair training and ther ex   REQUIRES PT FOLLOW UP: Yes  Activity Tolerance  Activity Tolerance: Patient limited by endurance; Patient limited by fatigue  Activity Tolerance: Pt with one coughing spell then later in session attempting to prevent cough 2/2 pain.  Pt educated on the importance of coughing to clear her lungs to prevent pneumonia and assist in decreasing her O2 need. G-Code     OutComes Score                                                  AM-PAC Score             Goals  Short term goals  Time Frame for Short term goals: 7 days   Short term goal 1: Ind bed mobility  Goal achieved  Short term goal 2: Ind with transf excluding floor transf Goal achieved  Short term goal 3: Ind with amb with LRAD >300' at a time  Goal achieved  Short term goal 4: Amb up and down 6 steps with MI (note, pt reports that she does not \"do\" steps other than to get into home.  The 6 step goal is a strengthening goal also)  Goal acheived  Long term goals  Time Frame for Long term goals : STG=LTG  Patient Goals   Patient goals : Decrease pain, Home w/o O2     Plan    Plan  Times per week: 5-7x week x 90 minutes  Times per day: Daily  Current Treatment Recommendations: Endurance Training, Wheelchair Mobility Training, Transfer Training, Functional Mobility Training, Safety Education & Training, Positioning, Balance Training, Pain Management, Stair training, Gait Training, Strengthening, Patient/Caregiver Education & Training  Plan Comment: Tentatively sched for d/c to home  with OP PT to start for f/u  Safety Devices  Type of devices: Call light within reach, Chair alarm in place, Left in chair, Nurse notified  Restraints  Initially in place: No     Therapy Time   Individual Concurrent Group Co-treatment   Time In 0930         Time Out 1030         Minutes 60         Timed Code Treatment Minutes: [address] Time:   Individual Concurrent Group Co-treatment   Time In 1300         Time Out 1330         Minutes 30           Timed Code Treatment Minutes:  60+30    Total Treatment Minutes:  Jostin Chavez PT     Vika Estes PT, DPT (Treatment and documentation for 2nd session only)

## 2019-05-23 NOTE — PROGRESS NOTES
[facility]    Respiratory Therapy     Home Oxygen Evaluation        Name: [address] [place] [address] Record Number: [id number]  Age: 79 y.o. Gender:  female   : 1950  Today's date: 2019  Room: [facility]310-      Assessment        /69   Pulse 79   Temp 97.5 °F (36.4 °C) (Oral)   Resp 18   Ht 5' 6\" (1.676 m)   Wt 125 lb 14.1 oz (57.1 kg)   SpO2 92%   Breastfeeding? No   BMI 20.32 kg/m²     Patient Active Problem List   Diagnosis    Esophageal varices in other disease    Hemochromatosis    DM type 2 (diabetes mellitus, type 2) ([address]    GERD (gastroesophageal reflux disease)    Thrombocytopenia (HCC)    Portal hypertension (HCC)    Gastrointestinal hemorrhage with melena    Hypotension    Esophageal varices (HCC)    Acute upper GI bleeding    Cirrhosis due to hemochromatosis    Respiratory failure following trauma and surgery ([facility] [address]    Bleeding esophageal varices ([facility] [address]    Multiple trauma       Social History:  Social History     Tobacco Use    Smoking status: Former Smoker     Packs/day: 1.00     Years: 25.00     Pack years: 25.00     Types: Cigarettes     Last attempt to quit: 10/19/2014     Years since quittin.5    Smokeless tobacco: Former User     Quit date: 2014    Tobacco comment: since she was 25 y/o   Substance Use Topics    Alcohol use: No    Drug use: No       Patient Room Air saturation at rest 88  %  Patient Room Air saturation upon ambulation 86 %    Oxygen saturations of 88% or less on RA qualifies patient for Home Oxygen    Patient resting on 2  lmp  with an oxygen saturation of  87 %     Patient ambulated on 3 lpm with an oxygen saturation of 92%        Qualifying patient for home oxygen with ambulation and continuous flow  @ 3 lpm.      In your clinical assessment does the Patient Require Portable Oxygen Tanks?     Yes               Patient/caregiver was educated on Home Oxygen process:  Yes      Level of patient/caregiver understanding able to: [] Verbalize understanding   [] Demonstrate understanding       [] Teach back        [] Needs reinforcement        []  No available caregiver               []  Other:     Response to education:  Very Good     Time Spent with Home O2 Set Up:  10  minutes     Chema Montero RCP on 5/23/2019 at 10:45 AM                 .

## 2019-05-23 NOTE — PROGRESS NOTES
Patient resting quietly in bed. Vital signs stable. Assessment completed. Oxygen in use at 4 L. Dyspnea on exertion noted. Breath sound diminished. Patient using incentive spirometer. Medicated for complaints of left side rib cage pain. Up ad louisa in room. Instructed to call with any needs.

## 2019-05-23 NOTE — CARE COORDINATION
Made referral to [facility] with [facility] for new home O2 3L. [facility] will deliver O2 to patient's room prior to d/c 5/24/19.     Electronically signed by ASHWIN Raymond, HECTORW on 5/23/2019 at 11:01 AM

## 2019-05-23 NOTE — PLAN OF CARE
Problem: Pain:  Goal: Pain level will decrease  Description  Pain level will decrease  Outcome: Ongoing     Problem: Falls - Risk of:  Goal: Will remain free from falls  Description  Will remain free from falls  Outcome: Ongoing  Note:   Patient has called appropriately for assistance. Fall precaution in place. Bed in low and locked position. Call light in reach. Bed side table within reach. Frequent visual checks made. She has remained free from injury. Problem: Pain:  Goal: Control of acute pain  Description  Control of acute pain  Note:   Patient has slept without further complaints of pain. Instructed to call with any needs. Call light in reach.

## 2019-05-23 NOTE — CARE COORDINATION
[facility] [address] [place] [address]  [phone]  Claim # [id number]. Electronically signed by ASHWIN Oh LSW on 5/23/2019 at 1:42 PM      Update: 5/24/19  Patient spoke with CM at [facility] who explained why claim was dismissed. Patient's Medicare is primary.     Electronically signed by ASHWIN Oh LSW on 5/24/2019 at 9:11 AM

## 2019-05-24 VITALS
BODY MASS INDEX: 20.23 KG/M2 | RESPIRATION RATE: 18 BRPM | OXYGEN SATURATION: 94 % | SYSTOLIC BLOOD PRESSURE: 129 MMHG | WEIGHT: 125.88 LBS | DIASTOLIC BLOOD PRESSURE: 66 MMHG | HEART RATE: 76 BPM | TEMPERATURE: 98 F | HEIGHT: 66 IN

## 2019-05-24 PROCEDURE — 6370000000 HC RX 637 (ALT 250 FOR IP): Performed by: PHYSICAL MEDICINE & REHABILITATION

## 2019-05-24 PROCEDURE — 6360000002 HC RX W HCPCS: Performed by: PHYSICAL MEDICINE & REHABILITATION

## 2019-05-24 RX ADMIN — ASPIRIN 81 MG 81 MG: 81 TABLET ORAL at 08:35

## 2019-05-24 RX ADMIN — METHOCARBAMOL TABLETS 1000 MG: 500 TABLET, COATED ORAL at 14:35

## 2019-05-24 RX ADMIN — OXYCODONE HYDROCHLORIDE 5 MG: 5 TABLET ORAL at 08:44

## 2019-05-24 RX ADMIN — CITALOPRAM HYDROBROMIDE 40 MG: 20 TABLET ORAL at 08:34

## 2019-05-24 RX ADMIN — ENOXAPARIN SODIUM 40 MG: 40 INJECTION SUBCUTANEOUS at 08:38

## 2019-05-24 RX ADMIN — PROPRANOLOL HYDROCHLORIDE 10 MG: 20 TABLET ORAL at 08:35

## 2019-05-24 RX ADMIN — TACROLIMUS 2 MG: 1 CAPSULE ORAL at 08:37

## 2019-05-24 RX ADMIN — OXYCODONE HYDROCHLORIDE 5 MG: 5 TABLET ORAL at 14:48

## 2019-05-24 RX ADMIN — GABAPENTIN 300 MG: 300 CAPSULE ORAL at 08:35

## 2019-05-24 RX ADMIN — GABAPENTIN 300 MG: 300 CAPSULE ORAL at 14:34

## 2019-05-24 RX ADMIN — CHOLECALCIFEROL (VITAMIN D3) 10 MCG (400 UNIT) TABLET 1000 UNITS: at 08:36

## 2019-05-24 RX ADMIN — METHOCARBAMOL TABLETS 1000 MG: 500 TABLET, COATED ORAL at 08:34

## 2019-05-24 RX ADMIN — MYCOPHENOLATE MOFETIL 500 MG: 500 TABLET, FILM COATED ORAL at 08:36

## 2019-05-24 ASSESSMENT — PAIN DESCRIPTION - PAIN TYPE: TYPE: ACUTE PAIN

## 2019-05-24 ASSESSMENT — PAIN DESCRIPTION - PROGRESSION: CLINICAL_PROGRESSION: NOT CHANGED

## 2019-05-24 ASSESSMENT — PAIN DESCRIPTION - DESCRIPTORS: DESCRIPTORS: ACHING;SHARP

## 2019-05-24 ASSESSMENT — PAIN - FUNCTIONAL ASSESSMENT: PAIN_FUNCTIONAL_ASSESSMENT: PREVENTS OR INTERFERES SOME ACTIVE ACTIVITIES AND ADLS

## 2019-05-24 ASSESSMENT — PAIN DESCRIPTION - ONSET: ONSET: ON-GOING

## 2019-05-24 ASSESSMENT — PAIN SCALES - GENERAL
PAINLEVEL_OUTOF10: 5
PAINLEVEL_OUTOF10: 6

## 2019-05-24 ASSESSMENT — PAIN DESCRIPTION - LOCATION: LOCATION: RIB CAGE

## 2019-05-24 ASSESSMENT — PAIN DESCRIPTION - ORIENTATION: ORIENTATION: LEFT

## 2019-05-24 ASSESSMENT — PAIN DESCRIPTION - FREQUENCY: FREQUENCY: INTERMITTENT

## 2019-05-24 NOTE — PROGRESS NOTES
Pt awake in bed. Physical assessment and vital signs as charted. Pt currently rates her pain as a 5 out of 10 on the pain scale, pain medication given at this time. Call light placed within reach. RN will continue to monitor Pt.

## 2019-05-24 NOTE — PLAN OF CARE
Problem: Falls - Risk of:  Goal: Will remain free from falls  Description  Will remain free from falls  Outcome: Ongoing  Note:    Pt is a High fall risk. See Rodena Hoot Fall Score and ABCDS Injury Risk assessments. High Fall Risk per MORALES/ABCDS: Explained fall risk precautions to pt and family and rationale behind their use to keep the patient safe. Pt bed is in low position, side rails up, call light and belongings are in reach. Fall wristband applied and present on pts wrist.  Bed alarm on. Pt encouraged to call for assistance. Will continue with hourly rounds for PO intake, pain needs, toileting and repositioning as needed.

## 2019-05-24 NOTE — PROGRESS NOTES
Nutrition Assessment    Type and Reason for Visit: Reassess    Nutrition Recommendations:   1. General diet  2. Will monitor nutritional adequacy, nutrition-related labs, weights, BMs, and clinical progress     Nutrition Assessment: Follow up:  Nutritional status continues to be stable eating % meals. Patient reported going home today. Malnutrition Assessment:  · Malnutrition Status: No malnutrition  · Context: Acute illness or injury  · Findings of the 6 clinical characteristics of malnutrition (Minimum of 2 out of 6 clinical characteristics is required to make the diagnosis of moderate or severe Protein Calorie Malnutrition based on AND/ASPEN Guidelines):  1. Energy Intake-Greater than 75% of estimated energy requirement,      2. Weight Loss-No significant weight loss,    3. Fat Loss-No significant subcutaneous fat loss,    4. Muscle Loss-No significant muscle mass loss,    5. Fluid Accumulation-No significant fluid accumulation,    6.   Strength-Not measured    Nutrition Risk Level: Low    Nutrition Diagnosis:   · Problem: No nutrition diagnosis at this time    Objective Information:  · Nutrition-Focused Physical Findings: non-pitting edema LUE; no BM recorded   · Wound Type: (abrasion left elbow)  · Current Nutrition Therapies:  · Oral Diet Orders: General   · Oral Diet intake: %(per pt )  · Anthropometric Measures:  · Ht: 5' 6\" (167.6 cm)   · Current Body Wt: 125 lb 14 oz (57.1 kg)  · Admission Body Wt: 129 lb 10.1 oz (58.8 kg)  · Usual Body Wt: 130 lb (59 kg)(to 140lb )  · Ideal Body Wt: 130 lb (59 kg), % Ideal Body    · BMI Classification: BMI 18.5 - 24.9 Normal Weight    Nutrition Interventions:   Continue current diet  Continued Inpatient Monitoring    Nutrition Evaluation:   · Monitoring: Meal Intake      Electronically signed by Estefany Ulloa RD, LD on 5/24/19 at 10:50 AM    Contact Number: [phone]

## 2019-05-24 NOTE — PROGRESS NOTES
Patient assessment is complete. Patient is A/Ox4, VSS and aferile. Patient denies any needs at this time. Call light is within reach, bed is in the lowest position with alarm on. Will continue to monitor.

## 2019-05-24 NOTE — FLOWSHEET NOTE
05/24/19 1233   Encounter Summary   Services provided to: Patient   Volunteer Visit   (5/23 sr K[facility] Nicolas)   Sacraments   Communion Patient received communion

## 2019-05-27 NOTE — DISCHARGE SUMMARY
Physical Medicine & Rehabilitation  Discharge Summary     Patient Identification:  Catarina Marrero  : 1950  Admit date: 2019  Discharge date: 2019  Attending provider: No att. providers found        Primary care provider: Charisma Borja MD     Discharge Diagnoses:   Patient Active Problem List   Diagnosis    Esophageal varices in other disease    Hemochromatosis    DM type 2 (diabetes mellitus, type 2) ([facility]    GERD (gastroesophageal reflux disease)    Thrombocytopenia (HCC)    Portal hypertension 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 [facility]    [facility]    Multiple trauma       Discharge Functional Status:    Physical therapy:  Bed Mobility: Scooting: Independent  Transfers: Sit to Stand: Independent  Stand to sit: Independent  Bed to Chair: Independent  Stand Pivot Transfers: Contact guard assistance  Comment: Pt req use of the rerstroom. Pt was able to lower pants/briefs indly. Pt demo Ind with pericare. Pt was able to pull pants/briefs up indly. Pt used the RW to walk to the sink and wash hands w/o difficulty, WB Status: No WB restrictions noted in the chart  Ambulation 1  Surface: level tile  Device: No Device  Other Apparatus: O2(PT  transports the O2 tank. Pt on 4L O2.)  Assistance: Modified Independent  Quality of Gait: Narrow ACACIA, kyphotic posture,   Gait Deviations: Slow Gale  Distance: 350' x2 ( this included amb up /down a ramp x 75' in each direction) , 100' x2  Comments: Intermittent VC for increased awareness of O2 cord.  Pt preference of pulling the O2 tank vs pushing 2/2 ease of control, Stairs  # Steps : 12(3+3+3+3)  Stairs Height: 6\"  Rails: Bilateral  Curbs: 8\"  Device: No Device(utilized hand rail)  Other Apparatus: Right, Left  Assistance: Modified independent Comment: Reciprocal pattern. Mobility: Bed, Chair, Wheel Chair: 7 - Patient independently transfers  Walk: 7- Complete [facility]  Walks at least 150 feet Independently with no assistive device  Distance Walked: 350'  Wheel Chair: 5 - Supervision Requires standby supervision or cuing to operate wheelchair at least 150 feet  Distance Traveled in [address]: 150'  Stairs: 6 - [address] goes up and down at least one flight of stairs but requries a side support, handrail, cane, or portable supports, or the activity takes more than a reasonable amount of times, or there are safety considerations, PT Equipment Recommendations  Equipment Needed: No  Other: Ongoing assessment at this time, Assessment: Pt has progressed well with therapy but continues to be limited by SOB, pain ( specifically in the L rib cage) and decreased endurance. Pt has achieved all of the previously set goals . Despite O2 sats dropping to low 80's, pt reports that she is not concerned about her oxygen levels. Pt still presents with higher level balance deficits and decreased endurance. Pt would benefit from continued therapy to maximize potential and increase overall functional mobility towards Ind for a safer return to home. Occupational therapy: Eatin - Feeds self with adaptive equipment/dentures and/or feeds self with modified diet and/or performs own tube feeding  Groomin - Patient independent with all grooming tasks  Bathin - Able to bathe all 10 areas with device  Dressing-Upper: 7 - Patient independently dresses upper body  Dressing-Lower: 7 - Patient independently dresses  Toiletin - Patient independent with all 3 tasks  Toilet Transfer: 7 -Patient independent on and off toilet/BSC  Shower Transfer: 6 - Modified independence,  , Assessment: Pt has demonstrated good progress in OT since admit and is now completing all ADLs MOD I- Independent.  Pt is able to complete simple IADL tasks w/ MOD I but benefits from PRN assistance for laundry and heavy lifting as pt is unable to lift over 10lbs. Pt is on 3L of O2 and remains mildly limited by SOB and decreased activity tolerance. Pt is returning home w/ PRN assistance and no ongoing OT needed at this time. Speech therapy:  Comprehension: 6 - Complex ideas 90% or device (hearing aid or glasses- if patient is primarily a visual learner)  Expression: 7 - Patient expresses complex ideas/needs  Social Interaction: 6 - Patient requires medication for mood and/or effect  Problem Solvin - Independent with device (e.g. notes, schedules)  Memory: 7 - Patient independent with meds/people/schedule    Inpatient Rehabilitation Course:   Lupe Rice is a 79 y.o. female admitted to inpatient rehabilitation on 2019 s/p Multiple trauma. The patient participated in an aggressive multidisciplinary inpatient rehabilitation program involving 3 hours of therapy per day, at least 5 days per week. The below items were addressed during her time on the ARU:    Multiple trauma: pain control and pulmonary toilet for rib fractures. TLSO for T3 compression fx. PT/OT     OLT: cellcept and prograf levels appropriate at . CMP appropriate on admission.     Neuropathy: gabapentin     HTN: inderal       Depression: celexa     CAD: ASA     Respiratory failure: Remains on O2 supplementation, complicated by rib fractures, stress IS, Duoneb PRN. Home with oxygen.     Discharge Exam:  Constitutional: Pleasant, no distress  Head: Normocephalic  Eyes: Conjunctiva noninjected  Pulm: CTA bilat  CV: No murmurs noted  Abd: Soft, nontender  Ext: No edema  Neuro: Alert, fully oriented, appropriate   MSK: No joint abnormalities noted     Significant Diagnostics:   Lab Results   Component Value Date    CREATININE 0.9 2019    BUN 23 (H) 2019     2019    K 4.3 2019     2019    CO2 31 2019       Lab Results   Component Value Date    WBC 1.6 (LL) 05/23/2019    HGB 7.9 (L) 05/23/2019    HCT 23.6 (L) 05/23/2019    MCV 99.3 05/23/2019     05/23/2019       Disposition:  Home in stable condition    Follow-up:  See after visit summary from hospitalization    Discharge Medications:     Medication List      START taking these medications    albuterol-ipratropium  MCG/ACT Aers inhaler  Commonly known as:  COMBIVENT RESPIMAT  Inhale 1 puff into the lungs every 4 hours as needed for Wheezing     gabapentin 300 MG capsule  Commonly known as:  NEURONTIN  Take 1 capsule by mouth 3 times daily for 30 days. Notes to patient:  Use: Treat neuropathic pain  Side effects: Dizziness, fatigue, nausea, diarrhea      lidocaine 4 % external patch  Place 1 patch onto the skin daily  Notes to patient:  ANALGESIC PATCH  Potential adverse reactions- rash at site     methocarbamol 500 MG tablet  Commonly known as:  ROBAXIN  Take 1 tablet by mouth 3 times daily as needed (spasms)  Notes to patient:  Use: Calm the muscles, ease pain  Side effects: feeling lightheaded, sleepy, having blurred eyesight, confusion     mycophenolate 500 MG tablet  Commonly known as:  CELLCEPT  Take 1 tablet by mouth 2 times daily  Notes to patient:  Immunosuppressant  Potential adverse reactions- swelling, diarrhea and vomiting     oxyCODONE 5 MG immediate release tablet  Commonly known as:  ROXICODONE  Take 1 tablet by mouth every 6 hours as needed for Pain for up to 7 days.   Notes to patient:  Use: Treat moderate to severe pain  Side effects: Nausea, headache, insomnia, dizziness, constipation     propranolol 10 MG tablet  Commonly known as:  INDERAL  Take 1 tablet by mouth 2 times daily  Notes to patient:  Blood pressure, antianginals  Potential adverse reactions fatigue, dizziness, low blood pressure     tacrolimus 1 MG capsule  Commonly known as:  PROGRAF  Take 2 capsules by mouth 2 times daily  Notes to patient:  Immunosuppressant  Potential adverse reactions- Diarrhea, headache, nausea and vomiting. CONTINUE taking these medications    aspirin 81 MG tablet     citalopram 40 MG tablet  Commonly known as:  CELEXA     pravastatin 10 MG tablet  Commonly known as:  PRAVACHOL     Vitamin D 1000 units Caps capsule  Commonly known as:  CHOLECALCIFEROL        STOP taking these medications    amLODIPine 2.5 MG tablet  Commonly known as:  NORVASC     calcium-vitamin D 500-200 MG-UNIT per tablet  Commonly known as:  OSCAL-500     carvedilol 6.25 MG tablet  Commonly known as:  COREG     furosemide 20 MG tablet  Commonly known as:  LASIX     loperamide 2 MG capsule  Commonly known as:  IMODIUM     metFORMIN 500 MG tablet  Commonly known as:  GLUCOPHAGE     omeprazole 40 MG delayed release capsule  Commonly known as:  PRILOSEC     rifaximin 550 MG tablet  Commonly known as:  XIFAXAN     spironolactone 100 MG tablet  Commonly known as:  ALDACTONE     traMADol 50 MG tablet  Commonly known as:  ULTRAM           Where to Get Your Medications      You can get these medications from any pharmacy    Bring a paper prescription for each of these medications  · albuterol-ipratropium  MCG/ACT Aers inhaler  · gabapentin 300 MG capsule  · lidocaine 4 % external patch  · methocarbamol 500 MG tablet  · mycophenolate 500 MG tablet  · oxyCODONE 5 MG immediate release tablet  · propranolol 10 MG tablet  · tacrolimus 1 MG capsule         I spent over 35 minutes on this discharge encounter between counseling, coordination of care, and medication reconciliation.   To comply with [address] [facility] bylaw [id number]: Discharge order placed in advance to facilitate discharge planning with rehab team.     Sanya Kennedy MD

## 2019-11-20 ENCOUNTER — HOSPITAL ENCOUNTER (OUTPATIENT)
Age: 69
Discharge: HOME | End: 2019-11-20
Payer: MEDICARE

## 2019-11-20 LAB
A/G RATIO: 1.8 (ref 1.1–2.2)
ALBUMIN SERPL-MCNC: 4.6 G/DL (ref 3.4–5)
ALP BLD-CCNC: 59 U/L (ref 40–129)
ALT SERPL-CCNC: 9 U/L (ref 10–40)
ANION GAP SERPL CALCULATED.3IONS-SCNC: 11 MMOL/L (ref 3–16)
AST SERPL-CCNC: 18 U/L (ref 15–37)
BASOPHILS ABSOLUTE: 0 K/UL (ref 0–0.2)
BASOPHILS RELATIVE PERCENT: 0.7 %
BILIRUB SERPL-MCNC: 1.1 MG/DL (ref 0–1)
BILIRUBIN DIRECT: 0.3 MG/DL (ref 0–0.3)
BILIRUBIN, INDIRECT: 0.8 MG/DL (ref 0–1)
BUN BLDV-MCNC: 26 MG/DL (ref 7–20)
C-REACTIVE PROTEIN, HIGH SENSITIVITY: 0.59 MG/L (ref 0.16–3)
CALCIUM SERPL-MCNC: 9.7 MG/DL (ref 8.3–10.6)
CHLORIDE BLD-SCNC: 99 MMOL/L (ref 99–110)
CHOLESTEROL, TOTAL: 130 MG/DL (ref 0–199)
CO2: 26 MMOL/L (ref 21–32)
CREAT SERPL-MCNC: 0.9 MG/DL (ref 0.6–1.2)
EOSINOPHILS ABSOLUTE: 0.1 K/UL (ref 0–0.6)
EOSINOPHILS RELATIVE PERCENT: 1.5 %
GFR AFRICAN AMERICAN: >60
GFR NON-AFRICAN AMERICAN: >60
GLOBULIN: 2.5 G/DL
GLUCOSE BLD-MCNC: 110 MG/DL (ref 70–99)
HCT VFR BLD CALC: 38.1 % (ref 36–48)
HDLC SERPL-MCNC: 46 MG/DL (ref 40–60)
HEMOGLOBIN: 13.3 G/DL (ref 12–16)
LDL CHOLESTEROL CALCULATED: 64 MG/DL
LYMPHOCYTES ABSOLUTE: 1.2 K/UL (ref 1–5.1)
LYMPHOCYTES RELATIVE PERCENT: 34.1 %
MCH RBC QN AUTO: 34 PG (ref 26–34)
MCHC RBC AUTO-ENTMCNC: 34.9 G/DL (ref 31–36)
MCV RBC AUTO: 97.4 FL (ref 80–100)
MONOCYTES ABSOLUTE: 0.2 K/UL (ref 0–1.3)
MONOCYTES RELATIVE PERCENT: 5.1 %
NEUTROPHILS ABSOLUTE: 2.1 K/UL (ref 1.7–7.7)
NEUTROPHILS RELATIVE PERCENT: 58.6 %
PDW BLD-RTO: 14.6 % (ref 12.4–15.4)
PHOSPHORUS: 3.3 MG/DL (ref 2.5–4.9)
PLATELET # BLD: 121 K/UL (ref 135–450)
PMV BLD AUTO: 7.6 FL (ref 5–10.5)
POTASSIUM SERPL-SCNC: 4.9 MMOL/L (ref 3.5–5.1)
RBC # BLD: 3.91 M/UL (ref 4–5.2)
SODIUM BLD-SCNC: 136 MMOL/L (ref 136–145)
TOTAL PROTEIN: 7.1 G/DL (ref 6.4–8.2)
TRIGL SERPL-MCNC: 100 MG/DL (ref 0–150)
VLDLC SERPL CALC-MCNC: 20 MG/DL
WBC # BLD: 3.6 K/UL (ref 4–11)

## 2019-11-20 PROCEDURE — 36415 COLL VENOUS BLD VENIPUNCTURE: CPT

## 2019-11-20 PROCEDURE — 80053 COMPREHEN METABOLIC PANEL: CPT

## 2019-11-20 PROCEDURE — 82248 BILIRUBIN DIRECT: CPT

## 2019-11-20 PROCEDURE — 84100 ASSAY OF PHOSPHORUS: CPT

## 2019-11-20 PROCEDURE — 80197 ASSAY OF TACROLIMUS: CPT

## 2019-11-20 PROCEDURE — 86141 C-REACTIVE PROTEIN HS: CPT

## 2019-11-20 PROCEDURE — 85025 COMPLETE CBC W/AUTO DIFF WBC: CPT

## 2019-11-20 PROCEDURE — 80061 LIPID PANEL: CPT

## 2019-11-20 PROCEDURE — 83036 HEMOGLOBIN GLYCOSYLATED A1C: CPT

## 2019-11-20 PROCEDURE — 86706 HEP B SURFACE ANTIBODY: CPT

## 2019-11-21 LAB
ESTIMATED AVERAGE GLUCOSE: 99.7 MG/DL
HBA1C MFR BLD: 5.1 %
TACROLIMUS BLOOD: 3.3 NG/ML (ref 5–20)

## 2019-11-22 LAB — HBV SURFACE AB TITR SER: 51.21 MIU/ML

## 2019-12-05 ENCOUNTER — HOSPITAL ENCOUNTER (OUTPATIENT)
Age: 69
Discharge: HOME | End: 2019-12-05
Payer: MEDICARE

## 2019-12-05 LAB — HBV SURFACE AB TITR SER: 55.57 MIU/ML

## 2019-12-05 PROCEDURE — 36415 COLL VENOUS BLD VENIPUNCTURE: CPT

## 2019-12-05 PROCEDURE — 87517 HEPATITIS B DNA QUANT: CPT

## 2019-12-05 PROCEDURE — 86706 HEP B SURFACE ANTIBODY: CPT

## 2019-12-08 LAB
HBV QNT LOG, IU/ML: NOT DETECTED LOG IU/ML
HBV QNT, IU/ML: NOT DETECTED IU/ML
INTERPRETATION: NOT DETECTED

## 2020-05-15 ENCOUNTER — HOSPITAL ENCOUNTER (OUTPATIENT)
Age: 70
Discharge: HOME | End: 2020-05-15
Payer: MEDICARE

## 2020-05-15 LAB
ALBUMIN SERPL-MCNC: 4.4 G/DL (ref 3.4–5)
ALP BLD-CCNC: 52 U/L (ref 40–129)
ALT SERPL-CCNC: 8 U/L (ref 10–40)
ANION GAP SERPL CALCULATED.3IONS-SCNC: 9 MMOL/L (ref 3–16)
AST SERPL-CCNC: 16 U/L (ref 15–37)
BASOPHILS ABSOLUTE: 0 K/UL (ref 0–0.2)
BASOPHILS RELATIVE PERCENT: 0.9 %
BILIRUB SERPL-MCNC: 1.2 MG/DL (ref 0–1)
BILIRUBIN DIRECT: 0.3 MG/DL (ref 0–0.3)
BILIRUBIN, INDIRECT: 0.9 MG/DL (ref 0–1)
BUN BLDV-MCNC: 29 MG/DL (ref 7–20)
CALCIUM SERPL-MCNC: 10 MG/DL (ref 8.3–10.6)
CHLORIDE BLD-SCNC: 102 MMOL/L (ref 99–110)
CO2: 26 MMOL/L (ref 21–32)
CREAT SERPL-MCNC: 1 MG/DL (ref 0.6–1.2)
EOSINOPHILS ABSOLUTE: 0.1 K/UL (ref 0–0.6)
EOSINOPHILS RELATIVE PERCENT: 1.8 %
GFR AFRICAN AMERICAN: >60
GFR NON-AFRICAN AMERICAN: 55
GLUCOSE BLD-MCNC: 157 MG/DL (ref 70–99)
HCT VFR BLD CALC: 36.6 % (ref 36–48)
HEMOGLOBIN: 12.6 G/DL (ref 12–16)
LYMPHOCYTES ABSOLUTE: 1.1 K/UL (ref 1–5.1)
LYMPHOCYTES RELATIVE PERCENT: 33.3 %
MCH RBC QN AUTO: 34 PG (ref 26–34)
MCHC RBC AUTO-ENTMCNC: 34.6 G/DL (ref 31–36)
MCV RBC AUTO: 98.3 FL (ref 80–100)
MONOCYTES ABSOLUTE: 0.2 K/UL (ref 0–1.3)
MONOCYTES RELATIVE PERCENT: 6.2 %
NEUTROPHILS ABSOLUTE: 1.9 K/UL (ref 1.7–7.7)
NEUTROPHILS RELATIVE PERCENT: 57.8 %
PDW BLD-RTO: 14.1 % (ref 12.4–15.4)
PHOSPHORUS: 4.2 MG/DL (ref 2.5–4.9)
PLATELET # BLD: 119 K/UL (ref 135–450)
PMV BLD AUTO: 7.7 FL (ref 5–10.5)
POTASSIUM SERPL-SCNC: 5.5 MMOL/L (ref 3.5–5.1)
RBC # BLD: 3.72 M/UL (ref 4–5.2)
SODIUM BLD-SCNC: 137 MMOL/L (ref 136–145)
TOTAL PROTEIN: 7 G/DL (ref 6.4–8.2)
WBC # BLD: 3.2 K/UL (ref 4–11)

## 2020-05-15 PROCEDURE — 80197 ASSAY OF TACROLIMUS: CPT

## 2020-05-15 PROCEDURE — 85025 COMPLETE CBC W/AUTO DIFF WBC: CPT

## 2020-05-15 PROCEDURE — 36415 COLL VENOUS BLD VENIPUNCTURE: CPT

## 2020-05-15 PROCEDURE — 80061 LIPID PANEL: CPT

## 2020-05-15 PROCEDURE — 80069 RENAL FUNCTION PANEL: CPT

## 2020-05-15 PROCEDURE — 80076 HEPATIC FUNCTION PANEL: CPT

## 2020-05-16 LAB — TACROLIMUS BLOOD: 9 NG/ML (ref 5–20)

## 2020-05-18 LAB
CHOLESTEROL, TOTAL: 91 MG/DL (ref 0–199)
HDLC SERPL-MCNC: 43 MG/DL (ref 40–60)
LDL CHOLESTEROL CALCULATED: 30 MG/DL
TRIGL SERPL-MCNC: 92 MG/DL (ref 0–150)
VLDLC SERPL CALC-MCNC: 18 MG/DL